# Patient Record
Sex: MALE | Race: WHITE | NOT HISPANIC OR LATINO | URBAN - METROPOLITAN AREA
[De-identification: names, ages, dates, MRNs, and addresses within clinical notes are randomized per-mention and may not be internally consistent; named-entity substitution may affect disease eponyms.]

---

## 2017-07-10 RX ORDER — DESOXIMETASONE 0.5 MG/G
1 CREAM TOPICAL 2 TIMES DAILY
COMMUNITY
Start: 2016-07-18 | End: 2017-07-10 | Stop reason: SDUPTHER

## 2017-07-10 RX ORDER — METHYLPREDNISOLONE 4 MG
2 TABLET, DOSE PACK ORAL DAILY
COMMUNITY
End: 2023-09-07

## 2017-07-19 ENCOUNTER — OFFICE VISIT (OUTPATIENT)
Dept: FAMILY MEDICINE | Facility: CLINIC | Age: 52
End: 2017-07-19
Payer: COMMERCIAL

## 2017-07-19 VITALS
HEART RATE: 76 BPM | HEIGHT: 64 IN | WEIGHT: 162 LBS | DIASTOLIC BLOOD PRESSURE: 76 MMHG | BODY MASS INDEX: 27.66 KG/M2 | SYSTOLIC BLOOD PRESSURE: 106 MMHG

## 2017-07-19 DIAGNOSIS — Z12.5 SCREENING FOR PROSTATE CANCER: ICD-10-CM

## 2017-07-19 DIAGNOSIS — Z00.00 ANNUAL PHYSICAL EXAM: Primary | ICD-10-CM

## 2017-07-19 PROBLEM — E66.3 OVERWEIGHT: Status: ACTIVE | Noted: 2017-07-19

## 2017-07-19 PROBLEM — Z87.39 HISTORY OF ARTHRITIS: Status: ACTIVE | Noted: 2017-07-19

## 2017-07-19 PROBLEM — Z86.69 HISTORY OF HEARING PROBLEM: Status: ACTIVE | Noted: 2017-07-19

## 2017-07-19 PROBLEM — Z78.9 NON-SMOKER: Status: ACTIVE | Noted: 2017-07-19

## 2017-07-19 PROCEDURE — 99396 PREV VISIT EST AGE 40-64: CPT | Mod: ,,, | Performed by: INTERNAL MEDICINE

## 2017-07-19 NOTE — PROGRESS NOTES
Subjective:       Patient ID: Brandon Suazo Sr. is a 51 y.o. male.    Chief Complaint: Annual Exam    The patient is  and has children (4 children). He works as a Contractor construction. The most recent health maintenance visit was year(s) ago. General health since the last visit is described as good. Prostate cancer screening includes prostate-specific antigen testing last year. Testicular cancer screening includes irregular self testicular examinations. Colorectal cancer screening includes no previous fecal occult blood testing. Metabolic screening includes a lipid profile within the past five years, glucose screening last year and uncertain timing of previous thyroid function test. Dental care includes brushing 2 times per day, flossing 1-2 times per day and regular dental visits. The patient reports wearing reading glasses. Hearing is normal. Immunizations are up to date. Current diet includes limited junk food. Dietary supplements do not include daily multivitamins, calcium or herbal products. The patient exercises daily. He is not concerned about weight. The patient has never smoked cigarettes. He reports occasional alcohol use. He has never used illicit drugs. He is sexually active and is monogamous with a female partner. For contraception he uses condoms. He has no concerns about erectile dysfunction. cardiovascular risk factors do not include hypertension, diabetes, obesity, tobacco use, illicit drug use or sedentary lifestyle. Pertinent family history includes dementia (M GM mild dementia) and lung cancer (Grand Father smoker), while pertinent family history does not include prostate cancer or colon cancer. Safety elements effectively used include: seat belt and smoke detector. Risk findings include: occupational exposure (construction), while risk findings do not include: passive smoke exposure, unsafe sexual behavior, stress management concerns, anger management concerns, unsafe driving habits,  multiple driving violations, history of DUI / DWI, memory loss, fall risk, travel to developing areas or tuberculosis exposure. He does not have a living will, durable power of  for health care directives or copy of advance directives on file.        Past Medical History:   Diagnosis Date    Hyperlipidemia     Psoriasis      Social History     Social History    Marital status:      Spouse name: N/A    Number of children: N/A    Years of education: N/A     Occupational History    Not on file.     Social History Main Topics    Smoking status: Never Smoker    Smokeless tobacco: Never Used    Alcohol use Yes    Drug use: No    Sexual activity: Yes     Partners: Female     Other Topics Concern    Not on file     Social History Narrative    No narrative on file     Past Surgical History:   Procedure Laterality Date    TONSILLECTOMY       Family History   Problem Relation Age of Onset    Hypertension Mother     Diabetes Mother     Macular degeneration Mother     Cancer Father      liver     Alcohol abuse Father     Asthma Paternal Aunt     Cancer Maternal Grandfather      lung/ smoker    HIV Maternal Uncle        Review of Systems   Constitutional: Negative for activity change, appetite change, fatigue and unexpected weight change.   HENT: Negative for congestion, sneezing and trouble swallowing.    Eyes: Negative for pain and visual disturbance.   Respiratory: Negative for cough, chest tightness and shortness of breath.    Cardiovascular: Negative for chest pain, palpitations and leg swelling.   Gastrointestinal: Negative for abdominal distention, abdominal pain, blood in stool, constipation and diarrhea.   Endocrine: Negative for cold intolerance, heat intolerance, polydipsia, polyphagia and polyuria.   Genitourinary: Negative for dysuria, hematuria and scrotal swelling.   Musculoskeletal: Positive for arthralgias. Negative for back pain and gait problem.        History of mild  "arthritis.   Skin: Positive for rash. Negative for pallor and wound.        History of mild psoriasis on the skin.   Allergic/Immunologic: Negative for environmental allergies, food allergies and immunocompromised state.   Neurological: Negative for dizziness, seizures, speech difficulty, light-headedness and numbness.   Hematological: Negative for adenopathy. Does not bruise/bleed easily.   Psychiatric/Behavioral: Negative for agitation, behavioral problems and confusion. The patient is not nervous/anxious.        Objective:       Vitals:    07/19/17 1025   BP: 106/76   Pulse: 76   Weight: 73.5 kg (162 lb)   Height: 5' 4" (1.626 m)     Physical Exam   Constitutional: He is oriented to person, place, and time. He appears well-developed and well-nourished. No distress.   HENT:   Head: Normocephalic and atraumatic.   Nose: Nose normal.   Mouth/Throat: Oropharynx is clear and moist. No oropharyngeal exudate.   Eyes: Conjunctivae and EOM are normal.   Neck: Normal range of motion. Neck supple. No JVD present. No tracheal deviation present. No thyromegaly present.   Cardiovascular: Normal rate, regular rhythm and normal heart sounds.  Exam reveals no gallop and no friction rub.    No murmur heard.  Pulmonary/Chest: Effort normal and breath sounds normal. No respiratory distress. He has no wheezes. He has no rales.   Abdominal: Soft. Bowel sounds are normal. He exhibits no distension. There is no tenderness. There is no guarding. No hernia.   Musculoskeletal: Normal range of motion.   Lymphadenopathy:     He has no cervical adenopathy.   Neurological: He is alert and oriented to person, place, and time. He has normal reflexes.   Skin: Skin is warm and dry.   Mild skin changes in the back   Psychiatric: He has a normal mood and affect. His behavior is normal.   Nursing note and vitals reviewed.      Assessment:       1. Annual physical exam    2. Screening for prostate cancer         Plan:           Annual physical exam  - "     Cancel: Glucose, fasting; Future; Expected date: 07/19/2017  -     Cancel: Lipid panel; Future; Expected date: 07/19/2017  -     Glucose, fasting; Future; Expected date: 07/19/2017  -     Lipid panel; Future; Expected date: 07/19/2017    Screening for prostate cancer  -     Cancel: PSA, Screening; Future; Expected date: 07/19/2017  -     PSA, Screening; Future; Expected date: 07/19/2017    Advised Mr. Suazo about age and season appropriate immunizations/ cancer screenings.  Also seasonal influenza vaccine, update on tetanus diphtheria vaccination every 10 years.

## 2017-07-19 NOTE — PATIENT INSTRUCTIONS
Prevention Guidelines, Men Ages 50 to 64  Screening tests and vaccines are an important part of managing your health. Health counseling is essential, too. Below are guidelines for these, for men ages 50 to 64. Talk with your healthcare provider to make sure youre up-to-date on what you need.  Screening Who needs it How often   Alcohol misuse All men in this age group At routine exams   Blood pressure All men in this age group Every 2 years if your blood pressure is less than 120/80 mm Hg; yearly if your systolic blood pressure is 120 to 139 mm Hg, or your diastolic blood pressure reading is 80 to 89 mm Hg   Colorectal cancer All men in this age group Flexible sigmoidoscopy every 5 years, or colonoscopy every 10 years, or double-contrast barium enema every 5 years; yearly fecal occult blood test or fecal immunochemical test; or a stool DNA test as often as your healthcare provider advises; talk with your healthcare provider about which tests are best for you   Depression All men in this age group At routine exams   Type 2 diabetes or prediabetes All adults beginning at age 45 and adults without symptoms at any age who are overweight or obese and have 1 or more other risk factors for diabetes At least every 3 years   Hepatitis C Men at increased risk for infection - talk with your healthcare provider At routine exams   High cholesterol or triglycerides All men in this age group At least every 5 years   HIV Men at increased risk for infection - talk with your healthcare provider At routine exams   Lung cancer Adults age 55 to 80 who have smoked Yearly screening in smokers with 30 pack-year history of smoking or who quit within 15 years   Obesity All men in this age group At routine exams   Prostate cancer Starting at age 45, talk to healthcare provider about risks and benefits of digital rectal exam (MARÍA) and prostate-specific antigen (PSA) screening1 At routine exams   Syphilis Men at increased risk for infection -  talk with your healthcare provider At routine exams   Tuberculosis Men at increased risk for infection - talk with your healthcare provider Ask your healthcare provider   Vision All men in this age group Ask your healthcare provider   Vaccine Who needs it How often   Chickenpox (varicella) All men in this age group who have no record of this infection or vaccine 2 doses; second dose should be given at least 4 weeks after the first dose   Hepatitis A Men at increased risk for infection - talk with your healthcare provider 2 doses given at least 6 months apart   Hepatitis B Men at increased risk for infection - talk with your healthcare provider 3 doses over 6 months; second dose should be given 1 month after the first dose; the third dose should be given at least 2 months after the second dose and at least 4 months after the first dose   Haemophilus influenzae Type B (HIB) Men at increased risk for infection - talk with your healthcare provider 1 to 3 doses   Influenza (flu) All men in this age group Once a year   Measles, mumps, rubella (MMR) Men in this age group through their late 50s who have no record of these infections or vaccines 1 or 2 dose; ask your healthcare provider   Meningococcal Men at increased risk for infection - talk with your healthcare provider 1 or more doses   Pneumococcal conjugate vaccine (PCV13) and pneumococcal polysaccharide vaccine (PPSV23) Men at increased risk for infection - talk with your healthcare provider PCV13: 1 dose ages 19 to 65 (protects against 13 types of pneumococcal bacteria)     PPSV23: 1 to 2 doses through age 64, or 1 dose at 65 or older (protects against 23 types of pneumococcal bacteria)      Tetanus/diphtheria/  pertussis (Td/Tdap) booster All men in this age group Td every 10 years, or a one-time dose of Tdap instead of a Td booster after age 18, then Td every 10 years   Zoster All men ages 60 and older 1 dose   Counseling Who needs it How often   Diet and exercise  Men who are overweight or obese When diagnosed, and then at routine exams   Sexually transmitted infection prevention Men at increased risk for infection - talk with your healthcare provider At routine exams   Use of daily aspirin Men in this age group at risk for cardiovascular health problems At routine exams   Use of tobacco and the health affects it can cause All men in this age group Every visit   [address] [place] [address] [facility]  Date Last Reviewed: 3/30/2015  © 9400-1285 [employer]. [address] [place] [address], [place] [address]. All rights reserved. This information is not intended as a substitute for professional medical care. Always follow your healthcare professional's instructions.

## 2017-07-24 LAB
COMPLEXED PSA SERPL-MCNC: 1 NG/ML (ref 0–3)
GLUCOSE: 98 MG/DL (ref 70–99)

## 2017-12-08 ENCOUNTER — OFFICE VISIT (OUTPATIENT)
Dept: FAMILY MEDICINE | Facility: CLINIC | Age: 52
End: 2017-12-08
Payer: COMMERCIAL

## 2017-12-08 VITALS
SYSTOLIC BLOOD PRESSURE: 136 MMHG | HEART RATE: 85 BPM | DIASTOLIC BLOOD PRESSURE: 85 MMHG | BODY MASS INDEX: 28.06 KG/M2 | WEIGHT: 163.5 LBS

## 2017-12-08 DIAGNOSIS — L02.415 ABSCESS OF RIGHT KNEE: Primary | ICD-10-CM

## 2017-12-08 PROCEDURE — 99212 OFFICE O/P EST SF 10 MIN: CPT | Mod: 25,,, | Performed by: INTERNAL MEDICINE

## 2017-12-08 PROCEDURE — 10060 I&D ABSCESS SIMPLE/SINGLE: CPT | Mod: ,,, | Performed by: INTERNAL MEDICINE

## 2017-12-08 RX ORDER — SULFAMETHOXAZOLE AND TRIMETHOPRIM 800; 160 MG/1; MG/1
1 TABLET ORAL 2 TIMES DAILY
Qty: 10 TABLET | Refills: 0 | Status: SHIPPED | OUTPATIENT
Start: 2017-12-08 | End: 2018-07-19

## 2017-12-08 NOTE — PROGRESS NOTES
Subjective:       Patient ID: Brandon Suazo Sr. is a 52 y.o. male.    Chief Complaint: Knee Pain (right knee infection); Recurrent Skin Infections; and Abscess    Knee Pain    The incident occurred 5 to 7 days ago. There was no injury mechanism. The pain is present in the right knee. The quality of the pain is described as burning. The pain is at a severity of 4/10. The pain is moderate.   Abscess   Chronicity:  NewProgression Since Onset: unchanged  Location:  Leg  Associated Symptoms: no fever, no chills, no sweats  Characteristics: painful    Characteristics: not draining    Pain Scale:  4/10      Past Medical History:   Diagnosis Date    Hyperlipidemia     Psoriasis      Social History     Social History    Marital status:      Spouse name: N/A    Number of children: N/A    Years of education: N/A     Occupational History    Not on file.     Social History Main Topics    Smoking status: Never Smoker    Smokeless tobacco: Never Used    Alcohol use Yes    Drug use: No    Sexual activity: Yes     Partners: Female     Other Topics Concern    Not on file     Social History Narrative    No narrative on file     Past Surgical History:   Procedure Laterality Date    TONSILLECTOMY       Family History   Problem Relation Age of Onset    Hypertension Mother     Diabetes Mother     Macular degeneration Mother     Cancer Father      liver     Alcohol abuse Father     Asthma Paternal Aunt     Cancer Maternal Grandfather      lung/ smoker    HIV Maternal Uncle        Review of Systems   Constitutional: Negative for chills and fever.   HENT: Negative for congestion and dental problem.    Eyes: Negative for discharge and itching.   Respiratory: Negative for choking and chest tightness.    Gastrointestinal: Negative for abdominal distention and abdominal pain.   Musculoskeletal: Negative for arthralgias and back pain.        Abscess on the right knee.   Skin:        Abscess on the right knee.        Objective:       Vitals:    12/08/17 1043   BP: 136/85   Pulse: 85   Weight: 74.2 kg (163 lb 8 oz)     Physical Exam   Constitutional: He appears well-developed and well-nourished. No distress.   HENT:   Head: Normocephalic and atraumatic.   Neck: Neck supple.   Cardiovascular: Normal rate.    Pulmonary/Chest: Effort normal.   Musculoskeletal:        Right knee: He exhibits erythema. He exhibits normal range of motion, no swelling, no effusion and no bony tenderness. No medial joint line, no lateral joint line, no MCL, no LCL and no patellar tendon tenderness noted.        Legs:  Range of motion of the knee joint itself is complete. The abscess seems to be superficial.   Nursing note and vitals reviewed.      Assessment:       1. Abscess of right knee         Plan:           Abscess of right knee  -     sulfamethoxazole-trimethoprim 800-160mg (BACTRIM DS) 800-160 mg Tab; Take 1 tablet by mouth 2 (two) times daily.  Dispense: 10 tablet; Refill: 0  -     INCISION AND DRAINAGE    Incision and drainage was performed after getting a general consent and verbal consent from the patient. Minimal amount of sanguinous expression was made. Pressure was applied around the abscess area to drain out any remnant puss. Not much of an abscess.    Patient tolerated the procedure well. He'll be treated with Bactrim. Local hygiene and clamminess measures have been discussed. He will keep his regular follow-up. He'll be no by next week as to how he is doing.    His last tetanus vaccination was in 2014.

## 2017-12-08 NOTE — PATIENT INSTRUCTIONS
Abscess (Incision & Drainage)  An abscess is sometimes called a boil. It happens when bacteria get trapped under the skin and start to grow. Pus forms inside the abscess as the body responds to the bacteria. An abscess can happen with an insect bite, ingrown hair, blocked oil gland, pimple, cyst, or puncture wound.  Your healthcare provider has drained the pus from your abscess. If the abscess pocket was large, your healthcare provider may have put in gauze packing. Your provider will need to remove it on your next visit. He or she may also replace it at that time. You may not need antibiotics to treat a simple abscess, unless the infection is spreading into the skin around the wound (cellulitis).  The wound will take about 1 to 2 weeks to heal, depending on the size of the abscess. Healthy tissue will grow from the bottom and sides of the opening until it seals over.  Home care  These tips can help your wound heal:  · The wound may drain for the first 2 days. Cover the wound with a clean dry dressing. Change the dressing if it becomes soaked with blood or pus.  · If a gauze packing was placed inside the abscess pocket, you may be told to remove it yourself. You may do this in the shower. Once the packing is removed, you should wash the area in the shower, or clean the area as directed by your provider. Continue to do this until the skin opening has closed. Make sure you wash your hands after changing the packing or cleaning the wound.  · If you were prescribed antibiotics, take them as directed until they are all gone.  · You may use acetaminophen or ibuprofen to control pain, unless another pain medicine was prescribed. If you have liver disease or ever had a stomach ulcer, talk with your doctor before using these medicines.  Follow-up care  Follow up with your healthcare provider, or as advised. If a gauze packing was put in your wound, it should be removed in 1 to 2 days. Check your wound every day for any  signs that the infection is getting worse. The signs are listed below.  When to seek medical advice  Call your healthcare provider right away if any of these occur:  · Increasing redness or swelling  · Red streaks in the skin leading away from the wound  · Increasing local pain or swelling  · Continued pus draining from the wound 2 days after treatment  · Fever of 100.4ºF (38ºC) or higher, or as directed by your healthcare provider  · Boil returns when you are at home  Date Last Reviewed: 9/1/2016  © 5382-5918 [employer]. [address] [place] [address]. All rights reserved. This information is not intended as a substitute for professional medical care. Always follow your healthcare professional's instructions.

## 2017-12-08 NOTE — PROCEDURES
Incision & Drainage  Date/Time: 12/8/2017 11:25 AM  Performed by: TIGRE GARCIA  Authorized by: TIGRE GARCIA       Type:  Abscess  Body area:  Lower extremity  Location details:  Right leg  Anesthesia:  Local infiltration  Local anesthetic: Lidocaine 1% without epinephrine  Scalpel size:  11  Incision type:  Single straight  Complexity:  Simple  Drainage:  Serosanguinous  Drainage amount:  Scant  Wound treatment:  Expression of material and no return  Packing material:  Vaseline gauze  Patient tolerance:  Patient tolerated the procedure well with no immediate complications

## 2018-07-19 ENCOUNTER — OFFICE VISIT (OUTPATIENT)
Dept: FAMILY MEDICINE | Facility: CLINIC | Age: 53
End: 2018-07-19
Payer: COMMERCIAL

## 2018-07-19 VITALS
RESPIRATION RATE: 16 BRPM | DIASTOLIC BLOOD PRESSURE: 74 MMHG | HEART RATE: 69 BPM | SYSTOLIC BLOOD PRESSURE: 119 MMHG | OXYGEN SATURATION: 97 % | WEIGHT: 163 LBS | BODY MASS INDEX: 27.83 KG/M2 | HEIGHT: 64 IN

## 2018-07-19 DIAGNOSIS — Z12.11 SCREENING FOR COLON CANCER: ICD-10-CM

## 2018-07-19 DIAGNOSIS — L40.9 PSORIASIS: ICD-10-CM

## 2018-07-19 DIAGNOSIS — Z11.59 NEED FOR HEPATITIS C SCREENING TEST: ICD-10-CM

## 2018-07-19 DIAGNOSIS — Z12.5 SCREENING FOR PROSTATE CANCER: ICD-10-CM

## 2018-07-19 DIAGNOSIS — Z00.00 ANNUAL PHYSICAL EXAM: Primary | ICD-10-CM

## 2018-07-19 PROCEDURE — 99396 PREV VISIT EST AGE 40-64: CPT | Mod: ,,, | Performed by: INTERNAL MEDICINE

## 2018-07-19 RX ORDER — DESOXIMETASONE 0.5 MG/G
CREAM TOPICAL 2 TIMES DAILY
Qty: 30 G | Refills: 1 | Status: SHIPPED | OUTPATIENT
Start: 2018-07-19 | End: 2019-07-24

## 2018-07-19 NOTE — PROGRESS NOTES
Subjective:       Patient ID: Brandon Suazo SrFadumo is a 52 y.o. male.    Chief Complaint: Annual Exam    Mr. Brandon Suazo is a pleasant 52-year-old  male who comes for annual physical. Thus far, he has been maintaining a reasonably good health. He does not have a diagnosis of hypertension, significant dyslipidemia or sugar diabetes.    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 Sometimes there seemed to be a little bit of stress over contract and bidding jobs. But he seems to deal with stressful issues fairly well. He is nonsmoker and alcohol consumption is purely social.    He does participate in hiking, biking and other activities. His job is most of his activities which keeps him nimble and agile.    He is happily  and father before children. One of his daughter works in the ICU of [employer] and she will be delivering today with patient's second grandchild.    Family history again has been reviewed with nothing significantly unremarkable.    He drives safely and does not have any driving volitions or tickets.    Dental care is regular at no major change in his eyesight.    He is due for colonoscopy and colon screening.    Current PSA is 1.0 and is within normal range. He has had PSA checks in the last 10 years which are in acceptable range.    He does have some arthritic symptoms he does take some glucosamine for the same.    He does make her regular visit to his dermatologist for some precancerous lesions in this scalp and he does use protection for the same.        Past Medical History:   Diagnosis Date    Hyperlipidemia     Psoriasis      Social History     Social History    Marital status:      Spouse name: N/A    Number of children: 4    Years of education: N/A     Occupational History    Construction- contracter General      Self - Brandon Suazo - construction     Social History Main Topics    Smoking status: Never Smoker    Smokeless tobacco:  Never Used    Alcohol use Yes    Drug use: No    Sexual activity: Yes     Partners: Female     Other Topics Concern    Not on file     Social History Narrative    No narrative on file     Past Surgical History:   Procedure Laterality Date    TONSILLECTOMY       Family History   Problem Relation Age of Onset    Hypertension Mother     Diabetes Mother     Macular degeneration Mother     Cancer Father         liver     Alcohol abuse Father     Asthma Paternal Aunt     Cancer Maternal Grandfather         lung/ smoker    HIV Maternal Uncle        Review of Systems   Constitutional: Negative for activity change, appetite change, chills, fatigue, fever and unexpected weight change.   HENT: Negative for congestion, dental problem, mouth sores, postnasal drip, sneezing and trouble swallowing.    Eyes: Negative for pain, discharge, itching and visual disturbance.   Respiratory: Negative for cough, choking, chest tightness and shortness of breath.    Cardiovascular: Negative for chest pain, palpitations and leg swelling.   Gastrointestinal: Negative for abdominal distention, abdominal pain, blood in stool, constipation and diarrhea.   Endocrine: Negative for cold intolerance, heat intolerance, polydipsia, polyphagia and polyuria.   Genitourinary: Negative for difficulty urinating, dysuria, flank pain, hematuria and scrotal swelling.        Patient gets up once at night to go to the bathroom.   Musculoskeletal: Negative for arthralgias, back pain and gait problem.        Mild arthritic symptoms in the joints which are better after taking glucosamine.   Skin: Negative for pallor, rash and wound.        Precancerous lesions on the scalp.   Allergic/Immunologic: Negative for environmental allergies, food allergies and immunocompromised state.   Neurological: Negative for dizziness, seizures, speech difficulty, light-headedness and numbness.   Hematological: Negative for adenopathy. Does not bruise/bleed easily.  "  Psychiatric/Behavioral: Negative for agitation, behavioral problems and confusion. The patient is not nervous/anxious.         Mild distress because of work-related issues and also his daughter is going to deliver a baby today. He is going to be grandfather times second now.       Objective:       Vitals:    07/19/18 1112 07/19/18 1154   BP: (!) 135/91 119/74   BP Location:  Right arm   Patient Position:  Sitting   Pulse: 69    Resp: 16    SpO2: 97%    Weight: 73.9 kg (163 lb)    Height: 5' 4" (1.626 m)      Physical Exam   Constitutional: He appears well-developed and well-nourished. No distress.   HENT:   Head: Normocephalic and atraumatic.   Right Ear: Tympanic membrane normal.   Left Ear: Tympanic membrane normal.   Nose: No mucosal edema or sinus tenderness.   Eyes: EOM are normal. Right eye exhibits no discharge. Left eye exhibits no discharge.   Neck: Neck supple.   Cardiovascular: Normal rate and regular rhythm.    Pulmonary/Chest: Effort normal.   Abdominal: Soft. Bowel sounds are normal.   Lymphadenopathy:     He has no cervical adenopathy.     He has no axillary adenopathy.   Neurological: He is alert.   Skin: He is not diaphoretic.   Psychiatric: He has a normal mood and affect. His speech is normal and behavior is normal.   Nursing note and vitals reviewed.      Assessment:       1. Annual physical exam    2. Screening for colon cancer    3. Screening for prostate cancer    4. Need for hepatitis C screening test         Plan:           Annual physical exam  -     Glucose, fasting; Future; Expected date: 07/19/2018  -     Lipid panel; Future; Expected date: 07/19/2018    Screening for colon cancer  -     Cologuard Screening (Multitarget Stool DNA)    Screening for prostate cancer  -     PSA, Screening; Future; Expected date: 07/19/2018    Need for hepatitis C screening test  -     Hepatitis C antibody; Future; Expected date: 07/19/2018    Advised Mr. Suazo about age and season appropriate " immunizations/ cancer screenings.  Also seasonal influenza vaccine, update on tetanus diphtheria vaccination every 10 years.    Patient will be screened again for blood glucose, lipid panel and PSA. He agrees to consider Cologuard screening if allowed by his insurance. If negative, the next repeat for Cologuard will be in 3 years time as opposed to a normal colonoscopy which is repeated every 10 years.    However, after Cologuard turns positive for one or the other reason he will eventually have to have a colonoscopy.    I will notify him off the labs.    Otherwise all things said and done, he should continue his activities and keep himself physically also active. He'll continue to watch his diet and avoid fried, greasy food and more greens and vegetables.    He does have some arthritic symptoms he does take some glucosamine for the same.    He does make her regular visit to his dermatologist for some precancerous lesions in this scalp and he does use protection for the same.    Please also note that patient's blood pressure at arrival was slightly elevated what seem to settle down fairly good after 15-20 minutes.

## 2018-07-19 NOTE — PATIENT INSTRUCTIONS

## 2018-07-31 LAB
COMPLEXED PSA SERPL-MCNC: 2.3 NG/ML (ref 0–3)
GLUCOSE: 89 MG/DL (ref 70–99)

## 2018-08-01 LAB — HCV AB SERPL QL IA: <0.1 S/CO RATIO (ref 0–0.9)

## 2018-10-14 ENCOUNTER — PATIENT MESSAGE (OUTPATIENT)
Dept: FAMILY MEDICINE | Facility: CLINIC | Age: 53
End: 2018-10-14

## 2018-10-28 ENCOUNTER — PATIENT MESSAGE (OUTPATIENT)
Dept: FAMILY MEDICINE | Facility: CLINIC | Age: 53
End: 2018-10-28

## 2019-07-24 ENCOUNTER — OFFICE VISIT (OUTPATIENT)
Dept: FAMILY MEDICINE | Facility: CLINIC | Age: 54
End: 2019-07-24

## 2019-07-24 VITALS
SYSTOLIC BLOOD PRESSURE: 117 MMHG | DIASTOLIC BLOOD PRESSURE: 77 MMHG | HEIGHT: 64 IN | BODY MASS INDEX: 27.49 KG/M2 | HEART RATE: 62 BPM | WEIGHT: 161 LBS

## 2019-07-24 DIAGNOSIS — Z00.00 ANNUAL PHYSICAL EXAM: Primary | ICD-10-CM

## 2019-07-24 DIAGNOSIS — Z12.5 SCREENING FOR PROSTATE CANCER: ICD-10-CM

## 2019-07-24 PROCEDURE — 99396 PREV VISIT EST AGE 40-64: CPT | Mod: ,,, | Performed by: INTERNAL MEDICINE

## 2019-07-24 PROCEDURE — 99396 PR PREVENTIVE VISIT,EST,40-64: ICD-10-PCS | Mod: ,,, | Performed by: INTERNAL MEDICINE
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
## 2019-07-24 NOTE — PATIENT INSTRUCTIONS
Preventing Cancer  Many cancer cases are linked to lifestyle. Healthy lifestyle choices can help lower your risk for cancer and many other diseases. They can also improve your overall health.    Stop smoking  · Talk with your healthcare provider about aids for quitting, such as nicotine patches and some prescription medicines.  · Get help from ex-smokers.  · Create a plan for quitting.  · Pick a quit date and stick to it.  Stay at a healthy weight  · Get to and stay at a healthy weight all your life.  · If you're overweight, losing even a little weight is good for you.  Keep active  · Get regular physical activity.  · Take walks, garden, or do other activities you enjoy each day.  · Do errands on foot or bike, not by car.  · Join a walking or biking club.  · Limit the time you spend sitting to do things like watch TV, play video games, or use a computer.  Eat a healthy diet  · Eat fewer red meats and processed meats.  · Eat at least 2.5 cups of fruits and vegetables daily, especially leafy greens.  · Eat more whole grains instead of refined grain products.  · Limit alcohol to 2 drinks a day for men and 1 drink a day for women.  · Limit high-calorie foods and drinks.  · Read food labels to be more aware of calories and portion sizes.  Protect yourself from hazards  · When outside during the day, use sunscreen that is broad-spectrum and SPF 30 or greater.  · When out in sunlight, wear a hat and sunglasses.  · Seek shade in the middle of the day when the sun is hottest.  · Be aware of all hazardous products at work or in your home.  · When working with hazardous products, wear protective clothing  Talk with your provider about cancer screenings  Regular screening can help prevent some types of cancer, such as cervical and colorectal cancer. Regular screening for these cancers can find and remove abnormal areas before they become cancer. For some other types of cancer, screening may help find cancer early, when it's  small. This is when treatment is most likely to be effective. Here are some ways you can screen for certain cancers:  · Breast cancer. Breast self-awareness and mammogram.  · Skin cancer. Self-exam, professional exam, biopsy of any changes that might be cancer.  · Cervical cancer. Pap test, HPV test.  · Colorectal cancer. Screening for blood or DNA in stool, colonoscopy or other tests to look inside the colon.  · Prostate cancer. PSA blood test with or without a digital rectal exam.  · Testicular cancer. Self-exam, professional exam.  Talk with your healthcare provider about your family history and your cancer risk. Together you can decide on the cancer screening plan that's best for you.  Date Last Reviewed: 11/18/2015  © 3735-6989 [employer]. [address] [place] [address], [place] [address]. All rights reserved. This information is not intended as a substitute for professional medical care. Always follow your healthcare professional's instructions.

## 2019-08-23 LAB
CHOLEST SERPL-MCNC: 200 MG/DL (ref 100–199)
GLUCOSE P FAST SERPL-MCNC: 97 MG/DL (ref 65–99)
HDLC SERPL-MCNC: 47 MG/DL
LDLC SERPL CALC-MCNC: 133 MG/DL (ref 0–99)
PSA SERPL-MCNC: 1.2 NG/ML (ref 0–4)
TRIGL SERPL-MCNC: 101 MG/DL (ref 0–149)
VLDLC SERPL CALC-MCNC: 20 MG/DL (ref 5–40)

## 2021-04-29 ENCOUNTER — PATIENT MESSAGE (OUTPATIENT)
Dept: RESEARCH | Facility: HOSPITAL | Age: 56
End: 2021-04-29

## 2021-06-23 ENCOUNTER — PATIENT MESSAGE (OUTPATIENT)
Dept: FAMILY MEDICINE | Facility: CLINIC | Age: 56
End: 2021-06-23

## 2021-06-23 ENCOUNTER — OFFICE VISIT (OUTPATIENT)
Dept: FAMILY MEDICINE | Facility: CLINIC | Age: 56
End: 2021-06-23

## 2021-06-23 VITALS
WEIGHT: 161 LBS | SYSTOLIC BLOOD PRESSURE: 117 MMHG | BODY MASS INDEX: 27.49 KG/M2 | DIASTOLIC BLOOD PRESSURE: 73 MMHG | HEART RATE: 71 BPM | HEIGHT: 64 IN

## 2021-06-23 DIAGNOSIS — E04.1 THYROID CYST: ICD-10-CM

## 2021-06-23 DIAGNOSIS — N52.9 ERECTILE DYSFUNCTION, UNSPECIFIED ERECTILE DYSFUNCTION TYPE: ICD-10-CM

## 2021-06-23 DIAGNOSIS — H93.13 TINNITUS OF BOTH EARS: Primary | ICD-10-CM

## 2021-06-23 PROCEDURE — 99213 PR OFFICE/OUTPT VISIT, EST, LEVL III, 20-29 MIN: ICD-10-PCS | Mod: S$PBB,,, | Performed by: INTERNAL MEDICINE

## 2021-06-23 PROCEDURE — 99213 OFFICE O/P EST LOW 20 MIN: CPT | Mod: S$PBB,,, | Performed by: INTERNAL MEDICINE

## 2021-06-23 PROCEDURE — 99213 OFFICE O/P EST LOW 20 MIN: CPT | Performed by: INTERNAL MEDICINE

## 2021-06-23 RX ORDER — SILDENAFIL 50 MG/1
50 TABLET, FILM COATED ORAL DAILY PRN
Qty: 10 TABLET | Refills: 5 | Status: SHIPPED | OUTPATIENT
Start: 2021-06-23 | End: 2022-06-22 | Stop reason: SDUPTHER

## 2021-07-06 ENCOUNTER — PATIENT MESSAGE (OUTPATIENT)
Dept: FAMILY MEDICINE | Facility: CLINIC | Age: 56
End: 2021-07-06

## 2022-06-20 NOTE — PROGRESS NOTES
Subjective:       Patient ID: Brandon Suazo SrFadumo is a 56 y.o. male.    Chief Complaint: Annual Exam    Mr. Brandon Suazo is a pleasant 56-year-old  male who comes for annual checkup.      Thus far he has not been diagnosed with any major medical issues.  He does have some minor arthritic symptoms for which he takes over-the-counter supplements like glucosamine/chondroitin.    Medical updates also include a moderate degree of persistent pain in the right knee off late for perhaps several months to years.  This effects his activities especially that he loves like hiking.  He had seen a chiropractor who had indicated that it might be a pulled MCL and he tried some manipulation therapy which might have helped him initially but not later on.  After that he was referred to orthopedic surgeon Dr. Joel Gallo who did x-rays and indicated that he has arthritis and probably aggravated his MCL.  He did get a cortisone shot from the lateral approach which gave him relief perhaps for a few months or so.  He had to recently gone other hiking trip and he was given a steroid pack on Mobic which did help him.  He tried some over-the-counter supplements like relief Factor which did not help him.  He continues to have pain on the medial aspect of his right knee joint.  Down the road the plan is perhaps to seek another consultation and see where we go from there.    He also had a lifeline screening which was negative for AAA, liver, spleen or kidney problems.  A small nodule or cystic nodule was noted in the left thyroid gland last year.      Previous labs have shown minimal dyslipidemia which did not merit treatment at this point.  His blood sugars have always have been normal.    Thus far he does not have any major prostate symptoms.  His PSA had shown a minimal rise recently.    He is nonsmoker and social alcohol use.  No illicit substance abuse.    He works as a  for buildings and projects.  He is   and has grandchildren.    He enjoys outdoors and activities like a rock climbing, hiking etc.    He drives safely.    He was updated on tetanus vaccination in 2014.  He had a Cologuard testing  October 2018 which was negative.  He will now be due for next Cologuard testing.    Mentally he seems to be happy and is in good and satisfactory relationship with his wife and family members and his colleagues and employees.    Family history also has been reviewed and updated.  (No major change)    Discussed about shingles vaccine and booster dose of COVID vaccine.      Past Medical History:   Diagnosis Date    Hyperlipidemia     Psoriasis      Social History     Socioeconomic History    Marital status:     Number of children: 4   Occupational History    Occupation: Construction- contracter General     Comment: Self - Brandon Suazo - [employer]   Tobacco Use    Smoking status: Never Smoker    Smokeless tobacco: Never Used   Substance and Sexual Activity    Alcohol use: Yes     Alcohol/week: 2.0 - 4.0 standard drinks     Types: 1 - 2 Cans of beer, 1 - 2 Shots of liquor per week     Comment: 1-2 per week    Drug use: No    Sexual activity: Yes     Partners: Female     Comment: No protection-wife may be perimenopausal.   Social History Narrative     [facility]        G- 34    G-32    B- 28    G 22     Social Determinants of Health     Financial Resource Strain: Low Risk     Difficulty of Paying Living Expenses: Not hard at all   Food Insecurity: No Food Insecurity    Worried About Running Out of Food in the Last Year: Never true    Ran Out of Food in the Last Year: Never true   Transportation Needs: No Transportation Needs    Lack of Transportation (Medical): No    Lack of Transportation (Non-Medical): No   Physical Activity: Sufficiently Active    Days of Exercise per Week: 5 days    Minutes of Exercise per Session: 30 min   Stress: No Stress Concern Present    Feeling of Stress : Only a little   Social  Connections: Socially Integrated    Frequency of Communication with Friends and Family: Three times a week    Frequency of Social Gatherings with Friends and Family: Three times a week    Attends [employer] Services: 1 to 4 times per year    Active Member of Clubs or Organizations: Yes    Attends Club or Organization Meetings: Never    Marital Status:    Housing Stability: Low Risk     Unable to Pay for Housing in the Last Year: No    Number of Places Lived in the Last Year: 1    Unstable Housing in the Last Year: No     Past Surgical History:   Procedure Laterality Date    SKIN CANCER EXCISION      TONSILLECTOMY       Family History   Problem Relation Age of Onset    Hypertension Mother     Diabetes Mother     Macular degeneration Mother     Bell's palsy Mother     Cancer Father         liver    Alcohol abuse Father     HIV Maternal Uncle     Asthma Paternal Aunt     Cancer Maternal Grandfather         lung/ smoker       Review of Systems   Constitutional: Negative for activity change, appetite change, diaphoresis, fatigue, fever and unexpected weight change.   HENT: Positive for tinnitus (Persistent but no change.). Negative for congestion, hearing loss, postnasal drip, rhinorrhea and trouble swallowing.    Eyes: Negative for pain, discharge and visual disturbance.   Respiratory: Negative for apnea, cough, chest tightness and wheezing.    Cardiovascular: Negative for chest pain, palpitations and leg swelling.   Gastrointestinal: Negative for abdominal distention, abdominal pain, anal bleeding, blood in stool, constipation, diarrhea and vomiting.   Endocrine: Negative for cold intolerance, heat intolerance, polydipsia and polyuria.        Recently discovered to have a thyroid complex cyst on the left lobe of thyroid.   Genitourinary: Negative for difficulty urinating, enuresis, frequency, hematuria and urgency.        Symptoms of erectile dysfunction.  Nocturia x1.  Good response with  "Viagra.  No major side effects except some nasal stuffiness.   Musculoskeletal: Negative for arthralgias, gait problem, joint swelling and neck pain.   Allergic/Immunologic: Negative for environmental allergies.   Neurological: Negative for dizziness, tremors, speech difficulty, weakness, light-headedness and headaches.   Psychiatric/Behavioral: Negative for agitation, confusion, decreased concentration and dysphoric mood. The patient is not nervous/anxious.          Objective:      Blood pressure 131/76, pulse 71, height 5' 4" (1.626 m), weight 73.9 kg (163 lb). Body mass index is 27.98 kg/m².  Physical Exam  Vitals and nursing note reviewed.   Constitutional:       General: He is not in acute distress.     Appearance: Normal appearance. He is well-developed. He is not ill-appearing, toxic-appearing or diaphoretic.      Comments: BMI is 27.98   HENT:      Head: Normocephalic and atraumatic.      Right Ear: There is no impacted cerumen.      Left Ear: There is no impacted cerumen.      Nose: Nose normal.      Mouth/Throat:      Pharynx: No oropharyngeal exudate.   Eyes:      General: No scleral icterus.     Conjunctiva/sclera: Conjunctivae normal.   Neck:      Thyroid: No thyroid mass, thyromegaly or thyroid tenderness.      Vascular: No JVD.      Trachea: No tracheal deviation.      Comments: Examination of thyroid gland does not show any evidence of mass, nodule or lesion.  Cardiovascular:      Rate and Rhythm: Normal rate and regular rhythm.      Heart sounds: Normal heart sounds. No murmur heard.    No friction rub. No gallop.   Pulmonary:      Effort: Pulmonary effort is normal. No respiratory distress.      Breath sounds: Normal breath sounds. No wheezing or rales.   Abdominal:      General: There is no distension.      Palpations: Abdomen is soft.      Tenderness: There is no abdominal tenderness.   Musculoskeletal:         General: No tenderness or deformity. Normal range of motion.      Cervical back: Neck " supple. No rigidity.      Right lower leg: No edema.      Left lower leg: No edema.      Comments: No tenderness or deformity or discoloration noted on the right chest wall where he had a minor injury recently.   Lymphadenopathy:      Cervical: No cervical adenopathy.      Right cervical: No superficial, deep or posterior cervical adenopathy.     Left cervical: No superficial, deep or posterior cervical adenopathy.   Skin:     General: Skin is warm and dry.      Findings: No lesion or rash.   Neurological:      Mental Status: He is alert and oriented to person, place, and time.      Deep Tendon Reflexes: Reflexes are normal and symmetric.   Psychiatric:         Mood and Affect: Mood normal.         Behavior: Behavior normal.         Thought Content: Thought content normal.           Assessment:       1. Annual physical exam    2. Screening for human immunodeficiency virus    3. Screening for colon cancer           No visits with results within 3 Month(s) from this visit.   Latest known visit with results is:   Office Visit on 07/24/2019   Component Date Value Ref Range Status    Cholesterol 08/22/2019 200 (A) 100 - 199 mg/dL Final    Triglycerides 08/22/2019 101  0 - 149 mg/dL Final    HDL 08/22/2019 47  >39 mg/dL Final    VLDL Cholesterol Sumeet 08/22/2019 20  5 - 40 mg/dL Final    LDL Calculated 08/22/2019 133 (A) 0 - 99 mg/dL Final    PSA - [facility] 08/22/2019 1.2  0.0 - 4.0 ng/mL Final    Glucose, Fasting 08/22/2019 97  65 - 99 mg/dL Final         Plan:           Annual physical exam  Comments:  Annual physical has been performed.  No major issues except for erectile dysfunction and need for Cologuard colorectal screening.  Orders:  -     Lipid Panel; Future; Expected date: 06/23/2022  -     Hemoglobin A1C; Future; Expected date: 06/23/2022  -     PSA, Screening; Future; Expected date: 06/23/2022    Screening for human immunodeficiency virus    Screening for colon cancer  -     Cologuard Screening  (Multitarget Stool DNA); Future; Expected date: 06/29/2022    A annual physical has been done.    Blood pressure and  BMI is good.  Today on preventive examination I have discussed the following:-    1.-colon cancer screening and options include a full-fledged regular colonoscopy which is considered to be the gold standard and if normal, the next 1 will be in 10 years time.  However the cumbersome nature of this procedure including the prep and need for transportation and taking time off is problematic.    2.-Cologuard testing which is more convenient with no prep time and transportation needs.  This is considered to be approximately 90% sensitive for colon cancer detection with some degree of false-positive results.  If this test is negative, the next testing will be in 3 years time and so on.  If it is positive, it may require a diagnostic colonoscopy.  Which is different from screening colonoscopy.  While technically both the procedures are same, the billing issues are  different between screening which is completely covered and diagnostic for which you will have to meet you deductibles and co-payment.    He would like to go ahead and proceed with Cologuard testing.    Discussed about shingles vaccine also and he will reflect on those issues.    He has not had the COVID vaccine thus far but there was suspicion that he might have had COVID symptoms when his son in law and daughter were afflicted.  This was sometimes last year.    fup 1 year or earlier as needed.        Current Outpatient Medications:     glucosamine sulfate 500 mg Tab, Take 2 tablets by mouth once daily., Disp: , Rfl:     multivitamin capsule, Take 1 capsule by mouth once daily., Disp: , Rfl:     sildenafiL (VIAGRA) 50 MG tablet, Take 1 tablet (50 mg total) by mouth daily as needed for Erectile Dysfunction., Disp: 10 tablet, Rfl: 5

## 2022-06-22 ENCOUNTER — OFFICE VISIT (OUTPATIENT)
Dept: FAMILY MEDICINE | Facility: CLINIC | Age: 57
End: 2022-06-22

## 2022-06-22 VITALS
HEART RATE: 71 BPM | DIASTOLIC BLOOD PRESSURE: 76 MMHG | WEIGHT: 163 LBS | BODY MASS INDEX: 27.83 KG/M2 | SYSTOLIC BLOOD PRESSURE: 131 MMHG | HEIGHT: 64 IN

## 2022-06-22 DIAGNOSIS — N52.9 ERECTILE DYSFUNCTION, UNSPECIFIED ERECTILE DYSFUNCTION TYPE: ICD-10-CM

## 2022-06-22 DIAGNOSIS — Z00.00 ANNUAL PHYSICAL EXAM: Primary | ICD-10-CM

## 2022-06-22 DIAGNOSIS — Z12.11 SCREENING FOR COLON CANCER: ICD-10-CM

## 2022-06-22 PROCEDURE — 99396 PR PREVENTIVE VISIT,EST,40-64: ICD-10-PCS | Mod: S$PBB,,, | Performed by: INTERNAL MEDICINE

## 2022-06-22 PROCEDURE — 99213 OFFICE O/P EST LOW 20 MIN: CPT | Performed by: INTERNAL MEDICINE

## 2022-06-22 PROCEDURE — 99396 PREV VISIT EST AGE 40-64: CPT | Mod: S$PBB,,, | Performed by: INTERNAL MEDICINE

## 2022-06-22 RX ORDER — SILDENAFIL 50 MG/1
50 TABLET, FILM COATED ORAL DAILY PRN
Qty: 10 TABLET | Refills: 11 | Status: SHIPPED | OUTPATIENT
Start: 2022-06-22 | End: 2023-08-18 | Stop reason: SDUPTHER

## 2022-06-24 LAB
CHOLEST SERPL-MCNC: 217 MG/DL (ref 100–199)
HBA1C MFR BLD: 6 % (ref 4.8–5.6)
HDLC SERPL-MCNC: 56 MG/DL
LDLC SERPL CALC-MCNC: 146 MG/DL (ref 0–99)
PSA SERPL-MCNC: 1.1 NG/ML (ref 0–4)
TRIGL SERPL-MCNC: 83 MG/DL (ref 0–149)
VLDLC SERPL CALC-MCNC: 15 MG/DL (ref 5–40)

## 2022-07-06 ENCOUNTER — PATIENT MESSAGE (OUTPATIENT)
Dept: FAMILY MEDICINE | Facility: CLINIC | Age: 57
End: 2022-07-06

## 2022-07-09 ENCOUNTER — PATIENT MESSAGE (OUTPATIENT)
Dept: FAMILY MEDICINE | Facility: CLINIC | Age: 57
End: 2022-07-09

## 2022-07-14 LAB — NONINV COLON CA DNA+OCC BLD SCRN STL QL: NEGATIVE

## 2022-08-12 ENCOUNTER — TELEPHONE (OUTPATIENT)
Dept: UROLOGY | Facility: CLINIC | Age: 57
End: 2022-08-12

## 2022-08-12 ENCOUNTER — TELEPHONE (OUTPATIENT)
Dept: FAMILY MEDICINE | Facility: CLINIC | Age: 57
End: 2022-08-12

## 2022-08-12 DIAGNOSIS — R31.9 HEMATURIA, UNSPECIFIED TYPE: ICD-10-CM

## 2022-08-12 DIAGNOSIS — R30.0 DIFFICULT OR PAINFUL URINATION: Primary | ICD-10-CM

## 2022-08-12 DIAGNOSIS — R30.0 DIFFICULT OR PAINFUL URINATION: ICD-10-CM

## 2022-08-12 RX ORDER — HYDROCODONE BITARTRATE AND ACETAMINOPHEN 5; 325 MG/1; MG/1
1 TABLET ORAL EVERY 8 HOURS PRN
Qty: 15 TABLET | Refills: 0 | Status: SHIPPED | OUTPATIENT
Start: 2022-08-12 | End: 2023-05-01

## 2022-08-12 RX ORDER — TAMSULOSIN HYDROCHLORIDE 0.4 MG/1
0.4 CAPSULE ORAL DAILY
Qty: 30 CAPSULE | Refills: 1 | Status: ON HOLD | OUTPATIENT
Start: 2022-08-12 | End: 2022-09-06 | Stop reason: SDUPTHER

## 2022-08-12 RX ORDER — HYDROCODONE BITARTRATE AND IBUPROFEN 5; 200 MG/1; MG/1
1 TABLET ORAL EVERY 8 HOURS PRN
Qty: 15 TABLET | Refills: 0 | Status: SHIPPED | OUTPATIENT
Start: 2022-08-12 | End: 2022-08-12 | Stop reason: RX

## 2022-08-12 RX ORDER — TAMSULOSIN HYDROCHLORIDE 0.4 MG/1
0.4 CAPSULE ORAL DAILY
Qty: 30 CAPSULE | Refills: 1 | Status: SHIPPED | OUTPATIENT
Start: 2022-08-12 | End: 2022-08-12 | Stop reason: SDUPTHER

## 2022-08-12 NOTE — TELEPHONE ENCOUNTER
Got urgent call from patient.  He had gone to urgent care center for pinkeye and also started developing some pain while urination and difficulty urination.  Probably some blood.  Continues to have symptoms.  Has been started on Bactrim.  Not much relief.    Thus far no history of kidney stones.    Difficult or painful urination  -     HYDROcodone-ibuprofen (VICOPROFEN) 5-200 mg per tablet; Take 1 tablet by mouth every 8 (eight) hours as needed for Pain.  Dispense: 15 tablet; Refill: 0  -     tamsulosin (FLOMAX) 0.4 mg Cap; Take 1 capsule (0.4 mg total) by mouth once daily.  Dispense: 30 capsule; Refill: 1  -     Ambulatory referral/consult to Urology; Future; Expected date: 08/22/2022  -     Urinalysis; Future; Expected date: 08/12/2022  -     Urine culture; Future; Expected date: 08/12/2022  -     CBC Auto Differential; Future; Expected date: 08/12/2022  -     Basic Metabolic Panel; Future; Expected date: 08/12/2022    Hematuria, unspecified type  -     HYDROcodone-ibuprofen (VICOPROFEN) 5-200 mg per tablet; Take 1 tablet by mouth every 8 (eight) hours as needed for Pain.  Dispense: 15 tablet; Refill: 0  -     tamsulosin (FLOMAX) 0.4 mg Cap; Take 1 capsule (0.4 mg total) by mouth once daily.  Dispense: 30 capsule; Refill: 1  -     Ambulatory referral/consult to Urology; Future; Expected date: 08/22/2022  -     Urinalysis; Future; Expected date: 08/12/2022  -     Urine culture; Future; Expected date: 08/12/2022  -     CBC Auto Differential; Future; Expected date: 08/12/2022  -     Basic Metabolic Panel; Future; Expected date: 08/12/2022

## 2022-08-12 NOTE — TELEPHONE ENCOUNTER
Referral received from Dr Tomas for dx of hematuria and Bph.  Inquired about pts past urological history pt declined   psa history is noted in the system   Pt was offered appt for 8/16 @ 845 am with Dr Mcfarland   Pt accepted

## 2022-08-12 NOTE — PROGRESS NOTES
Difficult or painful urination  -     tamsulosin (FLOMAX) 0.4 mg Cap; Take 1 capsule (0.4 mg total) by mouth once daily.  Dispense: 30 capsule; Refill: 1  -     HYDROcodone-acetaminophen (NORCO) 5-325 mg per tablet; Take 1 tablet by mouth every 8 (eight) hours as needed for Pain (pain while urination). Please take stool softeners or laxatives regularly to prevent constipation on this medication  Dispense: 15 tablet; Refill: 0    Hematuria, unspecified type  -     tamsulosin (FLOMAX) 0.4 mg Cap; Take 1 capsule (0.4 mg total) by mouth once daily.  Dispense: 30 capsule; Refill: 1  -     HYDROcodone-acetaminophen (NORCO) 5-325 mg per tablet; Take 1 tablet by mouth every 8 (eight) hours as needed for Pain (pain while urination). Please take stool softeners or laxatives regularly to prevent constipation on this medication  Dispense: 15 tablet; Refill: 0    pt wants meds sent to [facility]

## 2022-08-14 ENCOUNTER — HOSPITAL ENCOUNTER (EMERGENCY)
Facility: HOSPITAL | Age: 57
Discharge: HOME | End: 2022-08-14
Attending: EMERGENCY MEDICINE

## 2022-08-14 VITALS
SYSTOLIC BLOOD PRESSURE: 140 MMHG | OXYGEN SATURATION: 96 % | HEART RATE: 87 BPM | WEIGHT: 150 LBS | BODY MASS INDEX: 25.75 KG/M2 | RESPIRATION RATE: 18 BRPM | DIASTOLIC BLOOD PRESSURE: 68 MMHG | TEMPERATURE: 101 F

## 2022-08-14 DIAGNOSIS — R31.9 HEMATURIA: ICD-10-CM

## 2022-08-14 DIAGNOSIS — R33.9 URINARY RETENTION: Primary | ICD-10-CM

## 2022-08-14 LAB
ALBUMIN SERPL BCP-MCNC: 3.4 G/DL (ref 3.5–5.2)
ALP SERPL-CCNC: 104 U/L (ref 55–135)
ALT SERPL W/O P-5'-P-CCNC: 49 U/L (ref 10–44)
ANION GAP SERPL CALC-SCNC: 10 MMOL/L (ref 8–16)
AST SERPL-CCNC: 36 U/L (ref 10–40)
BACTERIA #/AREA URNS HPF: ABNORMAL /HPF
BASOPHILS # BLD AUTO: 0.03 K/UL (ref 0–0.2)
BASOPHILS NFR BLD: 0.3 % (ref 0–1.9)
BILIRUB SERPL-MCNC: 0.4 MG/DL (ref 0.1–1)
BILIRUB UR QL STRIP: NEGATIVE
BUN SERPL-MCNC: 15 MG/DL (ref 6–20)
CALCIUM SERPL-MCNC: 9 MG/DL (ref 8.7–10.5)
CHLORIDE SERPL-SCNC: 99 MMOL/L (ref 95–110)
CLARITY UR: CLEAR
CO2 SERPL-SCNC: 20 MMOL/L (ref 23–29)
COLOR UR: YELLOW
CREAT SERPL-MCNC: 1.3 MG/DL (ref 0.5–1.4)
DIFFERENTIAL METHOD: ABNORMAL
EOSINOPHIL # BLD AUTO: 0 K/UL (ref 0–0.5)
EOSINOPHIL NFR BLD: 0.1 % (ref 0–8)
ERYTHROCYTE [DISTWIDTH] IN BLOOD BY AUTOMATED COUNT: 14.4 % (ref 11.5–14.5)
EST. GFR  (NO RACE VARIABLE): >60 ML/MIN/1.73 M^2
GLUCOSE SERPL-MCNC: 126 MG/DL (ref 70–110)
GLUCOSE UR QL STRIP: NEGATIVE
HCT VFR BLD AUTO: 36.4 % (ref 40–54)
HGB BLD-MCNC: 12.8 G/DL (ref 14–18)
HGB UR QL STRIP: ABNORMAL
IMM GRANULOCYTES # BLD AUTO: 0.04 K/UL (ref 0–0.04)
IMM GRANULOCYTES NFR BLD AUTO: 0.4 % (ref 0–0.5)
KETONES UR QL STRIP: NEGATIVE
LEUKOCYTE ESTERASE UR QL STRIP: ABNORMAL
LYMPHOCYTES # BLD AUTO: 0.1 K/UL (ref 1–4.8)
LYMPHOCYTES NFR BLD: 0.9 % (ref 18–48)
MCH RBC QN AUTO: 27.4 PG (ref 27–31)
MCHC RBC AUTO-ENTMCNC: 35.2 G/DL (ref 32–36)
MCV RBC AUTO: 78 FL (ref 82–98)
MICROSCOPIC COMMENT: ABNORMAL
MONOCYTES # BLD AUTO: 1.4 K/UL (ref 0.3–1)
MONOCYTES NFR BLD: 13.2 % (ref 4–15)
NEUTROPHILS # BLD AUTO: 8.8 K/UL (ref 1.8–7.7)
NEUTROPHILS NFR BLD: 85.1 % (ref 38–73)
NITRITE UR QL STRIP: NEGATIVE
NRBC BLD-RTO: 0 /100 WBC
PH UR STRIP: 6 [PH] (ref 5–8)
PLATELET # BLD AUTO: 239 K/UL (ref 150–450)
PMV BLD AUTO: 10.9 FL (ref 9.2–12.9)
POTASSIUM SERPL-SCNC: 3.9 MMOL/L (ref 3.5–5.1)
PROT SERPL-MCNC: 7 G/DL (ref 6–8.4)
PROT UR QL STRIP: ABNORMAL
RBC # BLD AUTO: 4.67 M/UL (ref 4.6–6.2)
RBC #/AREA URNS HPF: 50 /HPF (ref 0–4)
SARS-COV-2 RDRP RESP QL NAA+PROBE: NEGATIVE
SODIUM SERPL-SCNC: 129 MMOL/L (ref 136–145)
SP GR UR STRIP: <=1.005 (ref 1–1.03)
URN SPEC COLLECT METH UR: ABNORMAL
UROBILINOGEN UR STRIP-ACNC: NEGATIVE EU/DL
WBC # BLD AUTO: 10.29 K/UL (ref 3.9–12.7)
WBC #/AREA URNS HPF: 12 /HPF (ref 0–5)

## 2022-08-14 PROCEDURE — 36415 COLL VENOUS BLD VENIPUNCTURE: CPT | Performed by: EMERGENCY MEDICINE

## 2022-08-14 PROCEDURE — 99285 EMERGENCY DEPT VISIT HI MDM: CPT | Mod: 25

## 2022-08-14 PROCEDURE — 85025 COMPLETE CBC W/AUTO DIFF WBC: CPT | Performed by: EMERGENCY MEDICINE

## 2022-08-14 PROCEDURE — 80053 COMPREHEN METABOLIC PANEL: CPT | Performed by: EMERGENCY MEDICINE

## 2022-08-14 PROCEDURE — 51702 INSERT TEMP BLADDER CATH: CPT

## 2022-08-14 PROCEDURE — 87086 URINE CULTURE/COLONY COUNT: CPT | Performed by: EMERGENCY MEDICINE

## 2022-08-14 PROCEDURE — 25000003 PHARM REV CODE 250: Performed by: EMERGENCY MEDICINE

## 2022-08-14 PROCEDURE — 96365 THER/PROPH/DIAG IV INF INIT: CPT

## 2022-08-14 PROCEDURE — 81000 URINALYSIS NONAUTO W/SCOPE: CPT | Performed by: EMERGENCY MEDICINE

## 2022-08-14 PROCEDURE — 96361 HYDRATE IV INFUSION ADD-ON: CPT

## 2022-08-14 PROCEDURE — 63600175 PHARM REV CODE 636 W HCPCS: Performed by: EMERGENCY MEDICINE

## 2022-08-14 PROCEDURE — U0002 COVID-19 LAB TEST NON-CDC: HCPCS | Performed by: EMERGENCY MEDICINE

## 2022-08-14 RX ORDER — CIPROFLOXACIN 500 MG/1
500 TABLET ORAL 2 TIMES DAILY
Qty: 20 TABLET | Refills: 0 | Status: SHIPPED | OUTPATIENT
Start: 2022-08-14 | End: 2022-08-24

## 2022-08-14 RX ORDER — ACETAMINOPHEN 500 MG
1000 TABLET ORAL
Status: COMPLETED | OUTPATIENT
Start: 2022-08-14 | End: 2022-08-14

## 2022-08-14 RX ORDER — SODIUM CHLORIDE 9 MG/ML
INJECTION, SOLUTION INTRAVENOUS
Status: COMPLETED | OUTPATIENT
Start: 2022-08-14 | End: 2022-08-14

## 2022-08-14 RX ORDER — LIDOCAINE HYDROCHLORIDE 20 MG/ML
JELLY TOPICAL
Status: COMPLETED | OUTPATIENT
Start: 2022-08-14 | End: 2022-08-14

## 2022-08-14 RX ADMIN — CEFTRIAXONE 1 G: 1 INJECTION, SOLUTION INTRAVENOUS at 03:08

## 2022-08-14 RX ADMIN — ACETAMINOPHEN 1000 MG: 500 TABLET ORAL at 04:08

## 2022-08-14 RX ADMIN — SODIUM CHLORIDE: 0.9 INJECTION, SOLUTION INTRAVENOUS at 01:08

## 2022-08-14 RX ADMIN — LIDOCAINE HYDROCHLORIDE 20 ML: 20 JELLY TOPICAL at 03:08

## 2022-08-14 NOTE — ED NOTES
Pt went home with monroe catheter connected to leg bag. Educated/Demonstrated to pt how to take care monroe cath, change to regular bag or leg bag and how to emptied the bag. Wash hand and keep aseptic technique. Pt verbalized understanding.

## 2022-08-14 NOTE — ED NOTES
Pt having trouble voiding, informed Dr. Up with order. Tried to place fr 18 catheter failed, pt urethral meatus is constricted, tried coude Fr 14, successfully inserted monroe cath using sterile technique, urine noted.

## 2022-08-14 NOTE — ED PROVIDER NOTES
Encounter Date: 8/14/2022       History     Chief Complaint   Patient presents with    Dysuria     X 2 weeks    Fever     Chief complaint:  Burning urination    HPI:  56-year-old male presents with a 1 week history of dysuria and a 4 day history of fever.  He was seen at urgent care and diagnosed with UTI treated with Bactrim.  He denies any history of previous UTI or ureterolithiasis.  He does report a sensation of fullness in the perineum which he suspects represents a prostate infection.  He has no headache or neck stiffness and denies any cough shortness of breath.  He does report eye redness.  He has no history of recent diarrheal illness.  Past medical history is significant for hyperlipidemia and psoriasis.        Review of patient's allergies indicates:   Allergen Reactions    No known drug allergies      Past Medical History:   Diagnosis Date    Hyperlipidemia     Psoriasis      Past Surgical History:   Procedure Laterality Date    SKIN CANCER EXCISION      TONSILLECTOMY       Family History   Problem Relation Age of Onset    Hypertension Mother     Diabetes Mother     Macular degeneration Mother     Bell's palsy Mother     Cancer Father         liver    Alcohol abuse Father     HIV Maternal Uncle     Asthma Paternal Aunt     Cancer Maternal Grandfather         lung/ smoker     Social History     Tobacco Use    Smoking status: Never Smoker    Smokeless tobacco: Never Used   Substance Use Topics    Alcohol use: Yes     Alcohol/week: 2.0 - 4.0 standard drinks     Types: 1 - 2 Cans of beer, 1 - 2 Shots of liquor per week     Comment: 1-2 per week    Drug use: No     Review of Systems   Constitutional: Negative for activity change, appetite change, chills, fatigue and fever.   Eyes: Negative for visual disturbance.   Respiratory: Negative for apnea and shortness of breath.    Cardiovascular: Negative for chest pain and palpitations.   Gastrointestinal: Negative for abdominal distention and  abdominal pain.   Genitourinary: Positive for dysuria, frequency and urgency. Negative for difficulty urinating, flank pain, hematuria, penile discharge, penile swelling and scrotal swelling.   Musculoskeletal: Negative for neck pain.   Skin: Negative for pallor and rash.   Neurological: Negative for headaches.   Hematological: Does not bruise/bleed easily.   Psychiatric/Behavioral: Negative for agitation.       Physical Exam     Initial Vitals [08/14/22 1256]   BP Pulse Resp Temp SpO2   127/65 97 20 (!) 102.1 °F (38.9 °C) 95 %      MAP       --         Physical Exam    Nursing note and vitals reviewed.  Constitutional: He appears well-developed and well-nourished.   HENT:   Head: Normocephalic and atraumatic.   Eyes: Conjunctivae are normal.   Neck: Neck supple.   Normal range of motion.  Cardiovascular: Normal rate, regular rhythm and normal heart sounds. Exam reveals no gallop and no friction rub.    No murmur heard.  Pulmonary/Chest: Breath sounds normal. No respiratory distress. He has no wheezes. He has no rhonchi. He has no rales.   Abdominal: Abdomen is soft. He exhibits no distension. There is no abdominal tenderness.   Musculoskeletal:         General: Normal range of motion.      Cervical back: Normal range of motion and neck supple.     Neurological: He is alert and oriented to person, place, and time.   Skin: Skin is warm and dry.   Psychiatric: He has a normal mood and affect.         ED Course   Procedures  Labs Reviewed   COMPREHENSIVE METABOLIC PANEL - Abnormal; Notable for the following components:       Result Value    Sodium 129 (*)     CO2 20 (*)     Glucose 126 (*)     Albumin 3.4 (*)     ALT 49 (*)     All other components within normal limits   CBC W/ AUTO DIFFERENTIAL - Abnormal; Notable for the following components:    Hemoglobin 12.8 (*)     Hematocrit 36.4 (*)     MCV 78 (*)     Gran # (ANC) 8.8 (*)     Lymph # 0.1 (*)     Mono # 1.4 (*)     Gran % 85.1 (*)     Lymph % 0.9 (*)     All  other components within normal limits   URINALYSIS, REFLEX TO URINE CULTURE - Abnormal; Notable for the following components:    Specific Gravity, UA <=1.005 (*)     Protein, UA Trace (*)     Occult Blood UA 3+ (*)     Leukocytes, UA 2+ (*)     All other components within normal limits    Narrative:     Specimen Source->Urine   URINALYSIS MICROSCOPIC - Abnormal; Notable for the following components:    RBC, UA 50 (*)     WBC, UA 12 (*)     All other components within normal limits    Narrative:     Specimen Source->Urine   CULTURE, URINE   SARS-COV-2 RNA AMPLIFICATION, QUAL          Imaging Results          CT Renal Stone Study ABD Pelvis WO (Final result)  Result time 08/14/22 15:41:09    Final result by Arin Weir MD (08/14/22 15:41:09)                 Impression:      There is no evidence hydronephrosis or obstructing renal calculus.  There are left peripelvic cysts.  The patient has a Varner catheter in place with air seen within the bladder.      Electronically signed by: Arin Weir MD  Date:    08/14/2022  Time:    15:41             Narrative:    EXAMINATION:  CT RENAL STONE STUDY ABD PELVIS WO    CLINICAL HISTORY:  Bladder-neck obstruction; Hematuria, unspecified    TECHNIQUE:  Low dose axial images, sagittal and coronal reformations were obtained from the lung bases to the pubic symphysis.  Contrast was not administered.    COMPARISON:  None    FINDINGS:  The liver, spleen, adrenal glands and pancreas are unremarkable.    There is no evidence renal calculus or hydronephrosis.  There are left peripelvic cysts.    The gallbladder is in place.    There is no evidence bowel obstruction.  There is no evidence right lower quadrant abdominal inflammation.    There is no evidence intra-abdominal free fluid or free air.  There is no evidence of abdominal nor pelvic lymphadenopathy.    The osseous structures are unremarkable.  There is a Varner catheter in the bladder with air seen in the bladder.                                  Medications   0.9%  NaCl infusion ( Intravenous Stopped 8/14/22 1506)   cefTRIAXone (ROCEPHIN) 1 g/50 mL D5W IVPB (0 g Intravenous Stopped 8/14/22 1546)   LIDOcaine HCl 2% urojet (20 mLs Mucous Membrane Given 8/14/22 1500)   acetaminophen tablet 1,000 mg (1,000 mg Oral Given 8/14/22 1603)     Medical Decision Making:   ED Management:  56-year-old male presents with a 1 week history of dysuria and a 4 day history of fever.  He had no relief with Bactrim.  Varner catheter is placed with a describe resistance in the urethra.  A 14 coude was eventually placed with 600 cc of urine removed.  Catheter remained in place.  Due to the fever he is treated empirically with Cipro after receiving 1 dose of ceftriaxone.  He is referred to Urology for further management of his urethral stricture.                      Clinical Impression:   Final diagnoses:  [R31.9] Hematuria  [R33.9] Urinary retention (Primary)          ED Disposition Condition    Discharge Stable        ED Prescriptions     Medication Sig Dispense Start Date End Date Auth. Provider    ciprofloxacin HCl (CIPRO) 500 MG tablet Take 1 tablet (500 mg total) by mouth 2 (two) times daily. for 10 days 20 tablet 8/14/2022 8/24/2022 Pola Up III, MD        Follow-up Information     Follow up With Specialties Details Why Contact Info    Alfredo Asif MD Urology In 3 days  [address] [address]  [address]  [place] [address]  [phone]             Pola Up III, MD  08/14/22 9570

## 2022-08-16 ENCOUNTER — OFFICE VISIT (OUTPATIENT)
Dept: UROLOGY | Facility: CLINIC | Age: 57
End: 2022-08-16

## 2022-08-16 ENCOUNTER — LAB VISIT (OUTPATIENT)
Dept: LAB | Facility: HOSPITAL | Age: 57
End: 2022-08-16
Attending: UROLOGY

## 2022-08-16 VITALS
WEIGHT: 151 LBS | SYSTOLIC BLOOD PRESSURE: 143 MMHG | BODY MASS INDEX: 25.78 KG/M2 | HEIGHT: 64 IN | DIASTOLIC BLOOD PRESSURE: 90 MMHG | HEART RATE: 87 BPM

## 2022-08-16 DIAGNOSIS — R33.9 URINARY RETENTION: Primary | ICD-10-CM

## 2022-08-16 DIAGNOSIS — R31.0 GROSS HEMATURIA: ICD-10-CM

## 2022-08-16 DIAGNOSIS — R50.9 FEVER, UNSPECIFIED FEVER CAUSE: ICD-10-CM

## 2022-08-16 DIAGNOSIS — R30.0 DIFFICULT OR PAINFUL URINATION: ICD-10-CM

## 2022-08-16 DIAGNOSIS — R31.9 HEMATURIA, UNSPECIFIED TYPE: ICD-10-CM

## 2022-08-16 LAB
BACTERIA UR CULT: NO GROWTH
BASOPHILS # BLD AUTO: 0.02 K/UL (ref 0–0.2)
BASOPHILS NFR BLD: 0.3 % (ref 0–1.9)
DIFFERENTIAL METHOD: ABNORMAL
EOSINOPHIL # BLD AUTO: 0.1 K/UL (ref 0–0.5)
EOSINOPHIL NFR BLD: 0.9 % (ref 0–8)
ERYTHROCYTE [DISTWIDTH] IN BLOOD BY AUTOMATED COUNT: 15 % (ref 11.5–14.5)
HCT VFR BLD AUTO: 43 % (ref 40–54)
HGB BLD-MCNC: 14.5 G/DL (ref 14–18)
IMM GRANULOCYTES # BLD AUTO: 0.03 K/UL (ref 0–0.04)
IMM GRANULOCYTES NFR BLD AUTO: 0.4 % (ref 0–0.5)
LYMPHOCYTES # BLD AUTO: 0.1 K/UL (ref 1–4.8)
LYMPHOCYTES NFR BLD: 0.9 % (ref 18–48)
MCH RBC QN AUTO: 27.1 PG (ref 27–31)
MCHC RBC AUTO-ENTMCNC: 33.7 G/DL (ref 32–36)
MCV RBC AUTO: 80 FL (ref 82–98)
MONOCYTES # BLD AUTO: 0.9 K/UL (ref 0.3–1)
MONOCYTES NFR BLD: 10.8 % (ref 4–15)
NEUTROPHILS # BLD AUTO: 6.8 K/UL (ref 1.8–7.7)
NEUTROPHILS NFR BLD: 86.7 % (ref 38–73)
NRBC BLD-RTO: 0 /100 WBC
PLATELET # BLD AUTO: 327 K/UL (ref 150–450)
PMV BLD AUTO: 11.1 FL (ref 9.2–12.9)
RBC # BLD AUTO: 5.36 M/UL (ref 4.6–6.2)
WBC # BLD AUTO: 7.85 K/UL (ref 3.9–12.7)

## 2022-08-16 PROCEDURE — 99999 PR PBB SHADOW E&M-EST. PATIENT-LVL V: CPT | Mod: PBBFAC,,, | Performed by: UROLOGY

## 2022-08-16 PROCEDURE — 99205 PR OFFICE/OUTPT VISIT, NEW, LEVL V, 60-74 MIN: ICD-10-PCS | Mod: S$PBB,,, | Performed by: UROLOGY

## 2022-08-16 PROCEDURE — 99999 PR PBB SHADOW E&M-EST. PATIENT-LVL V: ICD-10-PCS | Mod: PBBFAC,,, | Performed by: UROLOGY

## 2022-08-16 PROCEDURE — 99215 OFFICE O/P EST HI 40 MIN: CPT | Mod: PBBFAC,PN | Performed by: UROLOGY

## 2022-08-16 PROCEDURE — 85025 COMPLETE CBC W/AUTO DIFF WBC: CPT | Performed by: UROLOGY

## 2022-08-16 PROCEDURE — 99417 PR PROLONGED SVC, OUTPT, W/WO DIRECT PT CONTACT,  EA ADDTL 15 MIN: ICD-10-PCS | Mod: S$PBB,,, | Performed by: UROLOGY

## 2022-08-16 PROCEDURE — 99205 OFFICE O/P NEW HI 60 MIN: CPT | Mod: S$PBB,,, | Performed by: UROLOGY

## 2022-08-16 PROCEDURE — 36415 COLL VENOUS BLD VENIPUNCTURE: CPT | Performed by: UROLOGY

## 2022-08-16 PROCEDURE — 99417 PROLNG OP E/M EACH 15 MIN: CPT | Mod: S$PBB,,, | Performed by: UROLOGY

## 2022-08-16 PROCEDURE — 96372 THER/PROPH/DIAG INJ SC/IM: CPT | Mod: PBBFAC,PN

## 2022-08-16 PROCEDURE — 87040 BLOOD CULTURE FOR BACTERIA: CPT | Mod: 59 | Performed by: UROLOGY

## 2022-08-16 RX ORDER — GENTAMICIN SULFATE 40 MG/ML
80 INJECTION, SOLUTION INTRAMUSCULAR; INTRAVENOUS
Status: COMPLETED | OUTPATIENT
Start: 2022-08-16 | End: 2022-08-16

## 2022-08-16 RX ORDER — ACETAMINOPHEN 500 MG
500 TABLET ORAL EVERY 6 HOURS PRN
COMMUNITY

## 2022-08-16 RX ORDER — CEFTRIAXONE 1 G/1
1 INJECTION, POWDER, FOR SOLUTION INTRAMUSCULAR; INTRAVENOUS
Status: COMPLETED | OUTPATIENT
Start: 2022-08-16 | End: 2022-08-16

## 2022-08-16 RX ADMIN — CEFTRIAXONE SODIUM 1 G: 1 INJECTION, POWDER, FOR SOLUTION INTRAMUSCULAR; INTRAVENOUS at 10:08

## 2022-08-16 RX ADMIN — GENTAMICIN SULFATE 80 MG: 40 INJECTION, SOLUTION INTRAMUSCULAR; INTRAVENOUS at 10:08

## 2022-08-16 NOTE — PATIENT INSTRUCTIONS
"Complete course of cipro  Then when complete, finish the doxycycline  Restart and continue flomax 0.4mg nightly  At least in next 5 days schedule advil/motrin/ibuprofen 400-600mg 3x/d with meals  If T goes above 101, return the to the ER  Monday "fill and pull" voiding trial/catheter removal  9/6/22 prostate ultrasound and cystoscope at [facility]    "

## 2022-08-16 NOTE — PROGRESS NOTES
[facility] New Patient/H&P:     Brandon Suazo Sr. is a 56 y.o. male who presents for evaluation of dysuria and difficulty voiding at referral of Dr Tomas    Last routine health main visit 2022 noting ED on viagra, no bothersome LUTS, nocturia x1, PSA 1.1 (from 1.2 in 2019)  Notified by Dr Tomas on  pt called in with difficult or painful urination, hematuria, and needed asap appt. Booked for today  On review he was started on flomax by Dr Tomas later that afternoon .  He did present to ER on  notin week history of dysuria and a 4 day history of fever.  He was seen at urgent care and diagnosed with UTI treated with Bactrim.  He denies any history of previous UTI or ureterolithiasis.  He does report a sensation of fullness in the perineum which he suspects represents a prostate infection.  He has no headache or neck stiffness and denies any cough shortness of breath.  He does report eye redness.  He has no history of recent diarrheal illness.  T 102.1 in ER, UA micro 50 rbc, 12 wbc. Given rocpehin and changed from bactrim to cipro. WBC 10.29 (though on review has L shift 85% granulocytes, with prior WBCs on file 3.5-4.9 historically)  Varner catheter placed with noted resistance in the urethra.  A 14 coude was eventually placed with 600 cc of urine removed.    CT negative. No stones or other concerns    He presents today noting.  22 [facility] for burning with urination and redness in right eye x4d. Had been gradual onset over weekend.  Possible concern for STI given R eye inflam and burning sensation. Reports he declined STI testing due to cost and asked to be empirically treated and was given rocephin and doxycyline. Advised may have UTI. He reports partner of concern had negative testing same day full panel which he viewed negative results of  - on record review, no urine checked or UA. Had trichimonas testing only, which was negative. 1g IM rocephin, and Rx for  doxycycline and tobradex ophthalmic.   Tuesday started having fever subjectively, but peaked towards end of week at 102.3 Friday, which would break with extra strength tylenol.  Wednesday 8/10/22 started seeing traces of blood in urine, and returned to [facility] urgent care concerned about continued dysuria and progressive symptoms with fever/hematuria, and not much change in urinary symptoms. - on record review UA was negative except 2+ blood. No culture sent. Advised partially treated given no wbc/nits but given symptoms 14d course of Bactrim Rxed and tobradex refilled. Advised of possibility of Shital syndrome as again deferred STI teting noting partner had neg panel x3 at [facility] urgent care earlier in week. No urine culture sent due to cost concern  Pt reports after starting bactrim, he stopped doxycycline.  As above fever progressed through week, peaking at 102.3 on Friday at home, and his fever and dysuria persisted  He contacted PCP Friday 8/12 noting pain and progressive difficulty urinating and was given norco and flomax  By Sunday 8/14 no improvement and doubled flomax to BID, and then presented to ER as above and was able to void small amount on arrival before being unable to void and monroe placed in 600cc retention as above, and DCed home despite 102 fever (covid neg). And given symptoms persistence on bactrim, changed to cipro.  Never took more than 2d doxy, 4d bactrim, and has continued cipro  Still had subj fever and chills/sweats nightsweats last night and this was after 1g tylenol. Planned to take another dose this morning but was 98.6 so didn't take tylenol. Took cipro at 8am.   Still has lingering background headache. Bladder feels more comfortable for a change. Less pressure.  Having BMs - multiple - no issues. Did take some dulcolax during this but now ok. Pain and pressure along monroe during BM including pressure to urinate and slight leak around monroe  Was having sig SP pressure during episode  which subsided as of today  T 99.2 in clinic now  Has not taken his Flomax since the Varner catheter was placed        Past Medical History:   Diagnosis Date    Hyperlipidemia     Psoriasis        Past Surgical History:   Procedure Laterality Date    SKIN CANCER EXCISION      TONSILLECTOMY         Family History   Problem Relation Age of Onset    Hypertension Mother     Diabetes Mother     Macular degeneration Mother     Bell's palsy Mother     Cancer Father         liver    Alcohol abuse Father     HIV Maternal Uncle     Asthma Paternal Aunt     Cancer Maternal Grandfather         lung/ smoker       Social History     Socioeconomic History    Marital status:     Number of children: 4   Occupational History    Occupation: Construction- [employer]     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   Social History Narrative     GGBG        G- 33    G-31    B- 27    G 22     Social Determinants of Health     Financial Resource Strain: Low Risk     Difficulty of Paying Living Expenses: Not hard at all   Transportation Needs: No Transportation Needs    Lack of Transportation (Medical): No    Lack of Transportation (Non-Medical): No   Physical Activity: Sufficiently Active    Days of Exercise per Week: 5 days    Minutes of Exercise per Session: 30 min   Stress: No Stress Concern Present    Feeling of Stress : Only a little       Review of patient's allergies indicates:   Allergen Reactions    No known drug allergies        Medications Reviewed: see MAR    Focused Physical Exam    Vitals:    08/16/22 0845   BP:  "(!) 143/90   Pulse: 87     Body mass index is 25.92 kg/m². Weight: 68.5 kg (151 lb 0.2 oz) Height: 5' 4" (162.6 cm)       Abdomen: Soft, non-tender, nondistended, no CVA tenderness  :  Normal phallus and bilat desc normal testes in normal scrotum with 14fr 30cc coude monroe in place draining clear yellow urine to large bag via leg bag extension tubing      LABS:    Recent Results (from the past 336 hour(s))   COVID-19 Rapid Screening    Collection Time: 08/14/22  1:10 PM   Result Value Ref Range    SARS-CoV-2 RNA, Amplification, Qual Negative Negative   Urinalysis, Reflex to Urine Culture Urine, Clean Catch    Collection Time: 08/14/22  1:10 PM    Specimen: Urine   Result Value Ref Range    Specimen UA Urine, Clean Catch     Color, UA Yellow Yellow, Straw, [place]    Appearance, UA Clear Clear    pH, UA 6.0 5.0 - 8.0    Specific Gravity, UA <=1.005 (A) 1.005 - 1.030    Protein, UA Trace (A) Negative    Glucose, UA Negative Negative    Ketones, UA Negative Negative    Bilirubin (UA) Negative Negative    Occult Blood UA 3+ (A) Negative    Nitrite, UA Negative Negative    Urobilinogen, UA Negative <2.0 EU/dL    Leukocytes, UA 2+ (A) Negative   Urinalysis Microscopic    Collection Time: 08/14/22  1:10 PM   Result Value Ref Range    RBC, UA 50 (H) 0 - 4 /hpf    WBC, UA 12 (H) 0 - 5 /hpf    Bacteria Occasional None-Occ /hpf    Microscopic Comment SEE COMMENT    Urine culture    Collection Time: 08/14/22  1:10 PM    Specimen: Urine   Result Value Ref Range    Urine Culture, Routine No growth    Comprehensive Metabolic Panel    Collection Time: 08/14/22  1:18 PM   Result Value Ref Range    Sodium 129 (L) 136 - 145 mmol/L    Potassium 3.9 3.5 - 5.1 mmol/L    Chloride 99 95 - 110 mmol/L    CO2 20 (L) 23 - 29 mmol/L    Glucose 126 (H) 70 - 110 mg/dL    BUN 15 6 - 20 mg/dL    Creatinine 1.3 0.5 - 1.4 mg/dL    Calcium 9.0 8.7 - 10.5 mg/dL    Total Protein 7.0 6.0 - 8.4 g/dL    Albumin 3.4 (L) 3.5 - 5.2 g/dL    Total Bilirubin 0.4 " 0.1 - 1.0 mg/dL    Alkaline Phosphatase 104 55 - 135 U/L    AST 36 10 - 40 U/L    ALT 49 (H) 10 - 44 U/L    Anion Gap 10 8 - 16 mmol/L    eGFR >60 >60 mL/min/1.73 m^2   CBC Auto Differential    Collection Time: 08/14/22  1:18 PM   Result Value Ref Range    WBC 10.29 3.90 - 12.70 K/uL    RBC 4.67 4.60 - 6.20 M/uL    Hemoglobin 12.8 (L) 14.0 - 18.0 g/dL    Hematocrit 36.4 (L) 40.0 - 54.0 %    MCV 78 (L) 82 - 98 fL    MCH 27.4 27.0 - 31.0 pg    MCHC 35.2 32.0 - 36.0 g/dL    RDW 14.4 11.5 - 14.5 %    Platelets 239 150 - 450 K/uL    MPV 10.9 9.2 - 12.9 fL    Immature Granulocytes 0.4 0.0 - 0.5 %    Gran # (ANC) 8.8 (H) 1.8 - 7.7 K/uL    Immature Grans (Abs) 0.04 0.00 - 0.04 K/uL    Lymph # 0.1 (L) 1.0 - 4.8 K/uL    Mono # 1.4 (H) 0.3 - 1.0 K/uL    Eos # 0.0 0.0 - 0.5 K/uL    Baso # 0.03 0.00 - 0.20 K/uL    nRBC 0 0 /100 WBC    Gran % 85.1 (H) 38.0 - 73.0 %    Lymph % 0.9 (L) 18.0 - 48.0 %    Mono % 13.2 4.0 - 15.0 %    Eosinophil % 0.1 0.0 - 8.0 %    Basophil % 0.3 0.0 - 1.9 %    Differential Method Automated          Assessment/Diagnosis:    1. Urinary retention  Case Request Operating Room: CYSTOSCOPY, ULTRASOUND, RECTAL APPROACH    Place in Outpatient    gentamicin injection 80 mg    cefTRIAXone injection 1 g   2. Fever, unspecified fever cause  CBC Auto Differential    Blood culture    Blood culture    gentamicin injection 80 mg    cefTRIAXone injection 1 g   3. Gross hematuria  gentamicin injection 80 mg    cefTRIAXone injection 1 g   4. Difficult or painful urination  Ambulatory referral/consult to Urology   5. Hematuria, unspecified type  Ambulatory referral/consult to Urology       Plans:  Extensively review details of emergency department visit as above including labs and imaging and urine, as well as requested and reviewed all urgent care visit notes and treatment plans.  His initial concern given conjunctivitis and dysuria for STI has abated given negative partner testing which he has visualize, and notes that  he was treated empirically.  However though he received a g of Rocephin at the urgent care, he only took 2 days of his course of doxycycline before switching to Bactrim, which has then been switched to Cipro, so he did not complete treatment, and again reiterated the points made by urgent care about undiagnosed Shital syndrome etcetera, as well as GC/CT as his significant contributory source to prostatitis, however there can also be bacterial prostatitis, and most suspicious of prostatitis versus UTI as the source of his discomfort and retention, especially if left untreated and either smaller did 1 could be get the other.  However there was no urine on initial presentation before I am and p.o. antibiotics, and days later there was only urinalysis dipstick with 2+ blood.  His urine culture on time of ER visit was negative despite white blood cells and red blood cells on urinalysis, and in the absence of urinary calculus disease, it is possible the urine culture is treated given the multiple antibiotics he was on, or possible that with some white and red cells in the urine in the setting of negative urine culture there is clinical prostatitis.  Very difficult to determine the underlying etiology given his continued changes in treatment course and incomplete testing, which is extensively reviewed with him today.  However, in the setting of acute urinary retention, with persistent fever, advised could not remove Varner catheter today pending appropriate management and treatment plan.     Any infection present would need to be cleared her at least treated appropriately for a 5-7 day course as well as alpha blockers for 5-7 days from point of acute urinary retention before attempting voiding trial.  He has not taken his Flomax since the Varner catheter has been in place, and has only recently been on a treatment course of Cipro for 2 days.  I advised him to continue and complete the current course of Cipro, and when he is  done with that to go ahead and complete the course of doxycycline that he had, as these of the 2 best antibiotics with prostatic tissue penetrance to treat prostatitis which is the best underlying etiology given his symptom complex.  Did however discuss that concerns for untreated prostatitis with significant fevers 102 for days in a row, and persistent low-grade temperature despite IV, IM, and p.o. antibiotics, has concerns for sepsis and bacteremia.  No blood cultures were drawn in the emergency department with a fever of 102, with negative COVID, and negative urine culture.    With smoldering acute prostatitis on the top of the differential at this time, will continue current course of antibiotics, and he was given another 1 g IM of Rocephin in clinic today as well as 80 mg IM gentamicin.  He did have increasing white blood cell count, with left shift at time of ER visit, and has persistent low-grade temperature of 99.2° with night sweats and chills as recently as last night despite 1 g of Tylenol use.  I will go ahead and sent him to the lab on the way out for repeat CBC as well as blood cultures x2.  I would expect at this time given the significant amount of antibiotics utilized, blood cultures would be negative but certainly if they are positive this would generate return to the hospital and admission for IV antibiotics.  As well, if he spiked a fever greater than 101 at any point he should return to the emergency department and be admitted for IV antibiotics, otherwise he seems to be clinically responding to his course of p.o. antibiotics and we did add IM antibiotics today in clinic so he should continue with this course.  As well, advised him to schedule anti-inflammatories such as ibuprofen 400-600 mg t.i.d. with meals in the next 5-7 days to help decrease acute inflammation.    After 1 week of indwelling Varner catheter with alpha blockers, appropriate antibiotic treatment, and anti-inflammatories,  assuming that his blood cultures are negative, and white blood cell count is normalizing, will return for fill and pull voiding trial.  Described this in detail to the patient today for which he will continue Flomax afterwards.  We did also discuss that in the setting of acute urinary retention, even if he is able to pass a voiding trial and avoid replacing Varner catheter, this does mandate lower tract workup for obstruction and if any obstruction is found (Ie urethral stricture, or more commonly prostatic obstruction, of which obstruction can predispose inflammation such as prostatitis) this would be indication for relief of obstruction.  I suspect obstructive underlying etiology despite previously reported minimal LUTS and normal PSA, especially given this clinical course, retention, and difficulty noted passing Varner catheter in the emergency department.    Once he has completed all Cipro and followed by the remainder of his course of doxycycline and continued Flomax, whether or not he has passed a fill and pull voiding trial next week, have planned lower tract evaluation with cystoscopy and prostate ultrasound guided volumetric measurement of prostate to help evaluate for underlying obstruction and guide further recommendations.  This was scheduled on 9/6/22 at the [facility].    All questions patient had were answered in detail, and he was agreeable to treatment plan.  Patient instructions provided below:    As well, we did also have extensive discussion about including all relevant points of his HPI in his medical record, noting that only his medical providers have access to his medical record and if anyone else, certainly relatives, were to access his medical record, it would be grounds for their dismissal, and therefore his health information is protected in his medical record and visible only to himself, his medical providers, and anyone whom he himself grants access to his patient portal.  Therefore despite his  "multiple requests I would omit any relevant portions of the above history, which was ultimately acceptable to him after this explanation of HIPAA/PHI and routine medical practice and medical record access    AVS instructions  Complete course of cipro  Then when complete, finish the doxycycline  Restart and continue flomax 0.4mg nightly  At least in next 5 days schedule advil/motrin/ibuprofen 400-600mg 3x/d with meals  If T goes above 101, return the to the ER  Monday "fill and pull" voiding trial/catheter removal  9/6/22 prostate ultrasound and cystoscope at [facility]    Total time spent in/on encounter today, including face to face time with patient, counseling, medical record review, interpretation of tests/results, , and treatment plan coordination: 95 minutes  "

## 2022-08-16 NOTE — PROGRESS NOTES
Patient arrived in clinic today for 80 mg of Gentamicin and 1 gram of Rocephin  2 patient identifiers verified.  Genatmicin 80 mg IM to right glut.   Rocephin 1 g IM to left glut.   Patient tolerated injection well. No adverse reaction noted.  Patient left clinic in satisfactory condition.

## 2022-08-16 NOTE — Clinical Note
Well when you mentioned him Friday there were some details missing from the story!! I think he withheld some from you too... Such as he was already having 102 fevers at home on Friday And I cant believe with 102.3 in er on Sunday they sent him home!

## 2022-08-17 NOTE — PROGRESS NOTES
Can advise that WBC count has slightly improved though still has elevated granulocytes/left shift indicating acute infection as is evidenced by symptoms and low grade fever. Blood cultures pending and will result every 24 hours. Can review any treatment plans and remind that if he has continued nightsweats, T 101 or greater despite meds he should return to the ER for admission and IV antibiotics. As well if blood cultures are positive, will be directed back for admission

## 2022-08-18 ENCOUNTER — TELEPHONE (OUTPATIENT)
Dept: UROLOGY | Facility: CLINIC | Age: 57
End: 2022-08-18

## 2022-08-18 NOTE — TELEPHONE ENCOUNTER
Spoke w pt who voiced he is continuing to have night sweats but is taking tylenol around the clock.   pt voiced that since symptoms are continuing will moniter temp until after lunch and then go into ER for further care .Due to no change in symptoms.  Agree with pt given prior recommendations by MD   Pt informed MD will be informed as in surgery will call with any further recommendations if need be.

## 2022-08-18 NOTE — TELEPHONE ENCOUNTER
----- Message from Yoana Hughes sent at 8/18/2022 10:36 AM CDT -----  Contact: Brandon quentin [phone]  [email or web address]     Patient is calling for Medical Advice regarding:    How long has patient had these symptoms:    Pharmacy name and phone#:    Would like response via mobicanvast:call back    Comments: Pt is requesting a call back from Christine regarding some symptoms that he is having

## 2022-08-19 NOTE — TELEPHONE ENCOUNTER
Blood cultures are negative x3d (expected given abx use) but if still having fevers despite all his antibiotics and antipyretics, agree with return to ER for reeval and poss CT to eval for prostate abscess or other sources of his symptom complex with continued fever

## 2022-08-19 NOTE — TELEPHONE ENCOUNTER
Spoke w pt and informed of blood culture results   Pt voiced that he did not proceed to ER yesterday as he feels better and feels he is turning a corner and will be present for visit on Monday   Informed pt if any change to proceed to ER as previously discussed voiced ok

## 2022-08-21 LAB
BACTERIA BLD CULT: NORMAL
BACTERIA BLD CULT: NORMAL

## 2022-08-22 ENCOUNTER — CLINICAL SUPPORT (OUTPATIENT)
Dept: UROLOGY | Facility: CLINIC | Age: 57
End: 2022-08-22

## 2022-08-22 DIAGNOSIS — R33.9 URINARY RETENTION: Primary | ICD-10-CM

## 2022-08-22 PROCEDURE — 99499 UNLISTED E&M SERVICE: CPT | Mod: S$PBB,,, | Performed by: UROLOGY

## 2022-08-22 PROCEDURE — 51700 IRRIGATION OF BLADDER: CPT | Mod: PBBFAC,PN

## 2022-08-22 PROCEDURE — 51700 IRRIGATION OF BLADDER: CPT | Mod: S$PBB,,, | Performed by: UROLOGY

## 2022-08-22 PROCEDURE — 99499 NO LOS: ICD-10-PCS | Mod: S$PBB,,, | Performed by: UROLOGY

## 2022-08-22 PROCEDURE — 51700 PR IRRIGATION, BLADDER: ICD-10-PCS | Mod: S$PBB,,, | Performed by: UROLOGY

## 2022-08-22 NOTE — PROGRESS NOTES
Patient here today for Fill and Pull.   ?  10 ml removed from catheter balloon.  Bladder filled with 350 ml sterile water via catheter tip syringe.  Catheter removed with no difficulty.   ?  Patient voided 380 ml into urinal.   Patient educated to drink plenty of water.  Informed if unable to urinate RTC by 2:30/3pm or report of ER after hours.  Patient voiced understanding of instructions given.    Patient left office in satisfactory condition.

## 2022-09-01 NOTE — DISCHARGE INSTRUCTIONS
After the procedure    Drink plenty of fluids.  You may have burning or light bleeding when you urinate--this is normal.  Medications may be prescribed to ease any discomfort or prevent infection. Take these as directed.  Call your doctor if you have heavy bleeding or blood clots, burning that lasts more than a day, a fever over 100°F  (38° C), or trouble urinating.    After Surgery:  Always be aware that any surgery can cause these symptoms:    Pain- Medication can be prescribed for pain to decrease your pain but may not completely take your pain away.  Over the Counter pain medicine my be enough and you can always use Ice and rest to help ease pain.    Bleeding- a little bleeding after a surgery is usually within normal.  If there is a lot of blood you need to notify your MD.  Emergency treatments of bleeding are cold application, elevation of the bleeding site and compression.    Infection- Infection after surgery is NOT a normal occurrence.  Signs of infection are fever, swelling, hot to touch the incision.  If this occurs notify your MD immediately.    Nausea- this can be common after a surgery especially if you have had anesthesia medicine or are taking pain medicine.  Staying on clear liquids, bland foods, gingerale, or over the counter anti nausea medicines can help.  If you vomit more than once, notify your MD.  Anti Nausea medicines can be prescribed.          Transrectal Ultrasound   A transrectal ultrasound is an imaging test. It uses sound waves to create pictures of a mans prostate gland to determine its size.Your prostate gland is in front of your rectum and if too large may become inflamed and impede the flow of urine. For this test, a special probe (transducer) is placed directly into your rectum.     Before leaving, you may need to wait for a short time while the images are reviewed. In most cases, you can go back to your normal routine after the test.       © 8072-0607 [employer] [address] [place] [id number]. All rights reserved. This information is not intended as a substitute for professional medical care. Always follow your healthcare professional's instructions.

## 2022-09-03 ENCOUNTER — LAB VISIT (OUTPATIENT)
Dept: PRIMARY CARE CLINIC | Facility: CLINIC | Age: 57
End: 2022-09-03

## 2022-09-03 DIAGNOSIS — R33.9 URINARY RETENTION: ICD-10-CM

## 2022-09-03 PROCEDURE — U0005 INFEC AGEN DETEC AMPLI PROBE: HCPCS | Performed by: UROLOGY

## 2022-09-03 PROCEDURE — U0003 INFECTIOUS AGENT DETECTION BY NUCLEIC ACID (DNA OR RNA); SEVERE ACUTE RESPIRATORY SYNDROME CORONAVIRUS 2 (SARS-COV-2) (CORONAVIRUS DISEASE [COVID-19]), AMPLIFIED PROBE TECHNIQUE, MAKING USE OF HIGH THROUGHPUT TECHNOLOGIES AS DESCRIBED BY CMS-2020-01-R: HCPCS | Performed by: UROLOGY

## 2022-09-04 LAB — SARS-COV-2 RNA RESP QL NAA+PROBE: NOT DETECTED

## 2022-09-05 ENCOUNTER — TELEPHONE (OUTPATIENT)
Dept: FAMILY MEDICINE | Facility: CLINIC | Age: 57
End: 2022-09-05
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
## 2022-09-05 DIAGNOSIS — S30.861A TICK BITE OF ABDOMINAL WALL, INITIAL ENCOUNTER: Primary | ICD-10-CM

## 2022-09-05 RX ORDER — DOXYCYCLINE 100 MG/1
100 CAPSULE ORAL 2 TIMES DAILY
Qty: 14 CAPSULE | Refills: 0 | Status: SHIPPED | OUTPATIENT
Start: 2022-09-05 | End: 2023-09-07

## 2022-09-05 NOTE — TELEPHONE ENCOUNTER
Pt has tick bit in lower left abd wall    Tick bite of abdominal wall, initial encounter  -     doxycycline (MONODOX) 100 MG capsule; Take 1 capsule (100 mg total) by mouth 2 (two) times daily. Avoid exposure to direct sunlight on this med  Dispense: 14 capsule; Refill: 0

## 2022-09-06 ENCOUNTER — HOSPITAL ENCOUNTER (OUTPATIENT)
Facility: AMBULARY SURGERY CENTER | Age: 57
Discharge: HOME | End: 2022-09-06
Attending: UROLOGY | Admitting: UROLOGY

## 2022-09-06 DIAGNOSIS — R31.9 HEMATURIA, UNSPECIFIED TYPE: ICD-10-CM

## 2022-09-06 DIAGNOSIS — R30.0 DIFFICULT OR PAINFUL URINATION: ICD-10-CM

## 2022-09-06 DIAGNOSIS — R33.9 URINARY RETENTION: ICD-10-CM

## 2022-09-06 PROCEDURE — 52000 CYSTOURETHROSCOPY: CPT | Mod: ,,, | Performed by: UROLOGY

## 2022-09-06 PROCEDURE — 76872 PR US TRANSRECTAL: ICD-10-PCS | Mod: 26,,, | Performed by: UROLOGY

## 2022-09-06 PROCEDURE — 52000 CYSTOURETHROSCOPY: CPT | Performed by: UROLOGY

## 2022-09-06 PROCEDURE — 76872 US TRANSRECTAL: CPT | Mod: 26,,, | Performed by: UROLOGY

## 2022-09-06 PROCEDURE — 52000 PR CYSTOURETHROSCOPY: ICD-10-PCS | Mod: ,,, | Performed by: UROLOGY

## 2022-09-06 PROCEDURE — 76872 US TRANSRECTAL: CPT | Performed by: UROLOGY

## 2022-09-06 RX ORDER — WATER 1 ML/ML
IRRIGANT IRRIGATION
Status: DISCONTINUED | OUTPATIENT
Start: 2022-09-06 | End: 2022-09-06 | Stop reason: HOSPADM

## 2022-09-06 RX ORDER — LIDOCAINE HYDROCHLORIDE 20 MG/ML
JELLY TOPICAL
Status: DISCONTINUED | OUTPATIENT
Start: 2022-09-06 | End: 2022-09-06 | Stop reason: HOSPADM

## 2022-09-06 RX ORDER — TAMSULOSIN HYDROCHLORIDE 0.4 MG/1
0.4 CAPSULE ORAL DAILY
Qty: 30 CAPSULE | Refills: 11 | Status: SHIPPED | OUTPATIENT
Start: 2022-09-06 | End: 2023-09-07

## 2022-09-06 NOTE — PLAN OF CARE
Discharge instructions given to pt, verbalized understanding.  Tolerating fluids.  C/o pain 1/10.  Did not need to void.  Prescription sent to pharmacy per dr hester.  Ambulating out to self care in no distress.

## 2022-09-06 NOTE — H&P
[facility]rology New Patient/H&P:      Brandon Suazo Sr. is a 56 y.o. male who presents for evaluation of dysuria and difficulty voiding at referral of Dr Tomas     Last routine health main visit 2022 noting ED on viagra, no bothersome LUTS, nocturia x1, PSA 1.1 (from 1.2 in 2019)  Notified by Dr Tomas on  pt called in with difficult or painful urination, hematuria, and needed asap appt. Booked for today  On review he was started on flomax by Dr Tomas later that afternoon .  He did present to ER on  notin week history of dysuria and a 4 day history of fever.  He was seen at urgent care and diagnosed with UTI treated with Bactrim.  He denies any history of previous UTI or ureterolithiasis.  He does report a sensation of fullness in the perineum which he suspects represents a prostate infection.  He has no headache or neck stiffness and denies any cough shortness of breath.  He does report eye redness.  He has no history of recent diarrheal illness.  T 102.1 in ER, UA micro 50 rbc, 12 wbc. Given rocpehin and changed from bactrim to cipro. WBC 10.29 (though on review has L shift 85% granulocytes, with prior WBCs on file 3.5-4.9 historically)  Varner catheter placed with noted resistance in the urethra.  A 14 coude was eventually placed with 600 cc of urine removed.    CT negative. No stones or other concerns     He presents today noting.  22 went to [facility] for burning with urination and redness in right eye x4d. Had been gradual onset over weekend.  Possible concern for STI given R eye inflam and burning sensation. Reports he declined STI testing due to cost and asked to be empirically treated and was given rocephin and doxycyline. Advised may have UTI. He reports partner of concern had negative testing same day full panel which he viewed negative results of  - on record review, no urine checked or UA. Had trichimonas testing only, which was negative. 1g IM rocephin, and Rx for  doxycycline and tobradex ophthalmic.   Tuesday started having fever subjectively, but peaked towards end of week at 102.3 Friday, which would break with extra strength tylenol.  Wednesday 8/10/22 started seeing traces of blood in urine, and returned to [facility] urgent care concerned about continued dysuria and progressive symptoms with fever/hematuria, and not much change in urinary symptoms. - on record review UA was negative except 2+ blood. No culture sent. Advised partially treated given no wbc/nits but given symptoms 14d course of Bactrim Rxed and tobradex refilled. Advised of possibility of Shital syndrome as again deferred STI teting noting partner had neg panel x3 at [facility] urgent care earlier in week. No urine culture sent due to cost concern  Pt reports after starting bactrim, he stopped doxycycline.  As above fever progressed through week, peaking at 102.3 on Friday at home, and his fever and dysuria persisted  He contacted PCP Friday 8/12 noting pain and progressive difficulty urinating and was given norco and flomax  By Sunday 8/14 no improvement and doubled flomax to BID, and then presented to ER as above and was able to void small amount on arrival before being unable to void and monroe placed in 600cc retention as above, and DCed home despite 102 fever (covid neg). And given symptoms persistence on bactrim, changed to cipro.  Never took more than 2d doxy, 4d bactrim, and has continued cipro  Still had subj fever and chills/sweats nightsweats last night and this was after 1g tylenol. Planned to take another dose this morning but was 98.6 so didn't take tylenol. Took cipro at 8am.   Still has lingering background headache. Bladder feels more comfortable for a change. Less pressure.  Having BMs - multiple - no issues. Did take some dulcolax during this but now ok. Pain and pressure along omnroe during BM including pressure to urinate and slight leak around monroe  Was having sig SP pressure during episode  which subsided as of today  T 99.2 in clinic now  Has not taken his Flomax since the Varner catheter was placed                Past Medical History:   Diagnosis Date    Hyperlipidemia      Psoriasis                 Past Surgical History:   Procedure Laterality Date    SKIN CANCER EXCISION        TONSILLECTOMY                   Family History   Problem Relation Age of Onset    Hypertension Mother      Diabetes Mother      Macular degeneration Mother      Bell's palsy Mother      Cancer Father           liver    Alcohol abuse Father      HIV Maternal Uncle      Asthma Paternal Aunt      Cancer Maternal Grandfather           lung/ smoker         Social History               Socioeconomic History    Marital status:     Number of children: 4   Occupational History    Occupation: Construction- [employer]       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   Social History Narrative      GGBG           G- 33     G-31     B- 27     G 22      Social Determinants of Health          Financial Resource Strain: Low Risk     Difficulty of Paying Living Expenses: Not hard at all   Transportation Needs: No Transportation Needs    Lack of Transportation (Medical): No    Lack of Transportation (Non-Medical): No   Physical Activity: Sufficiently Active    Days of Exercise per Week: 5 days    Minutes of Exercise per Session: 30 min   Stress: No Stress Concern Present    Feeling of Stress : Only a little                 Review of patient's allergies indicates:   Allergen Reactions    No known drug allergies           Medications Reviewed: see  "MAR     Focused Physical Exam         Vitals:     08/16/22 0845   BP: (!) 143/90   Pulse: 87      Body mass index is 25.92 kg/m². Weight: 68.5 kg (151 lb 0.2 oz) Height: 5' 4" (162.6 cm)         Abdomen: Soft, non-tender, nondistended, no CVA tenderness  :  Normal phallus and bilat desc normal testes in normal scrotum with 14fr 30cc coude monroe in place draining clear yellow urine to large bag via leg bag extension tubing        LABS:     Recent Results         Recent Results (from the past 336 hour(s))   COVID-19 Rapid Screening     Collection Time: 08/14/22  1:10 PM   Result Value Ref Range     SARS-CoV-2 RNA, Amplification, Qual Negative Negative   Urinalysis, Reflex to Urine Culture Urine, Clean Catch     Collection Time: 08/14/22  1:10 PM     Specimen: Urine   Result Value Ref Range     Specimen UA Urine, Clean Catch       Color, UA Yellow Yellow, Straw, [place]anna     Appearance, UA Clear Clear     pH, UA 6.0 5.0 - 8.0     Specific Gravity, UA <=1.005 (A) 1.005 - 1.030     Protein, UA Trace (A) Negative     Glucose, UA Negative Negative     Ketones, UA Negative Negative     Bilirubin (UA) Negative Negative     Occult Blood UA 3+ (A) Negative     Nitrite, UA Negative Negative     Urobilinogen, UA Negative <2.0 EU/dL     Leukocytes, UA 2+ (A) Negative   Urinalysis Microscopic     Collection Time: 08/14/22  1:10 PM   Result Value Ref Range     RBC, UA 50 (H) 0 - 4 /hpf     WBC, UA 12 (H) 0 - 5 /hpf     Bacteria Occasional None-Occ /hpf     Microscopic Comment SEE COMMENT     Urine culture     Collection Time: 08/14/22  1:10 PM     Specimen: Urine   Result Value Ref Range     Urine Culture, Routine No growth     Comprehensive Metabolic Panel     Collection Time: 08/14/22  1:18 PM   Result Value Ref Range     Sodium 129 (L) 136 - 145 mmol/L     Potassium 3.9 3.5 - 5.1 mmol/L     Chloride 99 95 - 110 mmol/L     CO2 20 (L) 23 - 29 mmol/L     Glucose 126 (H) 70 - 110 mg/dL     BUN 15 6 - 20 mg/dL     Creatinine 1.3 0.5 - " 1.4 mg/dL     Calcium 9.0 8.7 - 10.5 mg/dL     Total Protein 7.0 6.0 - 8.4 g/dL     Albumin 3.4 (L) 3.5 - 5.2 g/dL     Total Bilirubin 0.4 0.1 - 1.0 mg/dL     Alkaline Phosphatase 104 55 - 135 U/L     AST 36 10 - 40 U/L     ALT 49 (H) 10 - 44 U/L     Anion Gap 10 8 - 16 mmol/L     eGFR >60 >60 mL/min/1.73 m^2   CBC Auto Differential     Collection Time: 08/14/22  1:18 PM   Result Value Ref Range     WBC 10.29 3.90 - 12.70 K/uL     RBC 4.67 4.60 - 6.20 M/uL     Hemoglobin 12.8 (L) 14.0 - 18.0 g/dL     Hematocrit 36.4 (L) 40.0 - 54.0 %     MCV 78 (L) 82 - 98 fL     MCH 27.4 27.0 - 31.0 pg     MCHC 35.2 32.0 - 36.0 g/dL     RDW 14.4 11.5 - 14.5 %     Platelets 239 150 - 450 K/uL     MPV 10.9 9.2 - 12.9 fL     Immature Granulocytes 0.4 0.0 - 0.5 %     Gran # (ANC) 8.8 (H) 1.8 - 7.7 K/uL     Immature Grans (Abs) 0.04 0.00 - 0.04 K/uL     Lymph # 0.1 (L) 1.0 - 4.8 K/uL     Mono # 1.4 (H) 0.3 - 1.0 K/uL     Eos # 0.0 0.0 - 0.5 K/uL     Baso # 0.03 0.00 - 0.20 K/uL     nRBC 0 0 /100 WBC     Gran % 85.1 (H) 38.0 - 73.0 %     Lymph % 0.9 (L) 18.0 - 48.0 %     Mono % 13.2 4.0 - 15.0 %     Eosinophil % 0.1 0.0 - 8.0 %     Basophil % 0.3 0.0 - 1.9 %     Differential Method Automated                 Assessment/Diagnosis:     1. Urinary retention  Case Request Operating Room: CYSTOSCOPY, ULTRASOUND, RECTAL APPROACH     Place in Outpatient     gentamicin injection 80 mg     cefTRIAXone injection 1 g   2. Fever, unspecified fever cause  CBC Auto Differential     Blood culture     Blood culture     gentamicin injection 80 mg     cefTRIAXone injection 1 g   3. Gross hematuria  gentamicin injection 80 mg     cefTRIAXone injection 1 g   4. Difficult or painful urination  Ambulatory referral/consult to Urology   5. Hematuria, unspecified type  Ambulatory referral/consult to Urology         Plans:  Extensively review details of emergency department visit as above including labs and imaging and urine, as well as requested and reviewed all  urgent care visit notes and treatment plans.  His initial concern given conjunctivitis and dysuria for STI has abated given negative partner testing which he has visualize, and notes that he was treated empirically.  However though he received a g of Rocephin at the urgent care, he only took 2 days of his course of doxycycline before switching to Bactrim, which has then been switched to Cipro, so he did not complete treatment, and again reiterated the points made by urgent care about undiagnosed Shital syndrome etcetera, as well as GC/CT as his significant contributory source to prostatitis, however there can also be bacterial prostatitis, and most suspicious of prostatitis versus UTI as the source of his discomfort and retention, especially if left untreated and either smaller did 1 could be get the other.  However there was no urine on initial presentation before I am and p.o. antibiotics, and days later there was only urinalysis dipstick with 2+ blood.  His urine culture on time of ER visit was negative despite white blood cells and red blood cells on urinalysis, and in the absence of urinary calculus disease, it is possible the urine culture is treated given the multiple antibiotics he was on, or possible that with some white and red cells in the urine in the setting of negative urine culture there is clinical prostatitis.  Very difficult to determine the underlying etiology given his continued changes in treatment course and incomplete testing, which is extensively reviewed with him today.  However, in the setting of acute urinary retention, with persistent fever, advised could not remove Varner catheter today pending appropriate management and treatment plan.      Any infection present would need to be cleared her at least treated appropriately for a 5-7 day course as well as alpha blockers for 5-7 days from point of acute urinary retention before attempting voiding trial.  He has not taken his Flomax since the  Varner catheter has been in place, and has only recently been on a treatment course of Cipro for 2 days.  I advised him to continue and complete the current course of Cipro, and when he is done with that to go ahead and complete the course of doxycycline that he had, as these of the 2 best antibiotics with prostatic tissue penetrance to treat prostatitis which is the best underlying etiology given his symptom complex.  Did however discuss that concerns for untreated prostatitis with significant fevers 102 for days in a row, and persistent low-grade temperature despite IV, IM, and p.o. antibiotics, has concerns for sepsis and bacteremia.  No blood cultures were drawn in the emergency department with a fever of 102, with negative COVID, and negative urine culture.     With smoldering acute prostatitis on the top of the differential at this time, will continue current course of antibiotics, and he was given another 1 g IM of Rocephin in clinic today as well as 80 mg IM gentamicin.  He did have increasing white blood cell count, with left shift at time of ER visit, and has persistent low-grade temperature of 99.2° with night sweats and chills as recently as last night despite 1 g of Tylenol use.  I will go ahead and sent him to the lab on the way out for repeat CBC as well as blood cultures x2.  I would expect at this time given the significant amount of antibiotics utilized, blood cultures would be negative but certainly if they are positive this would generate return to the hospital and admission for IV antibiotics.  As well, if he spiked a fever greater than 101 at any point he should return to the emergency department and be admitted for IV antibiotics, otherwise he seems to be clinically responding to his course of p.o. antibiotics and we did add IM antibiotics today in clinic so he should continue with this course.  As well, advised him to schedule anti-inflammatories such as ibuprofen 400-600 mg t.i.d. with meals  in the next 5-7 days to help decrease acute inflammation.     After 1 week of indwelling Varner catheter with alpha blockers, appropriate antibiotic treatment, and anti-inflammatories, assuming that his blood cultures are negative, and white blood cell count is normalizing, will return for fill and pull voiding trial.  Described this in detail to the patient today for which he will continue Flomax afterwards.  We did also discuss that in the setting of acute urinary retention, even if he is able to pass a voiding trial and avoid replacing Varner catheter, this does mandate lower tract workup for obstruction and if any obstruction is found (Ie urethral stricture, or more commonly prostatic obstruction, of which obstruction can predispose inflammation such as prostatitis) this would be indication for relief of obstruction.  I suspect obstructive underlying etiology despite previously reported minimal LUTS and normal PSA, especially given this clinical course, retention, and difficulty noted passing Varner catheter in the emergency department.     Once he has completed all Cipro and followed by the remainder of his course of doxycycline and continued Flomax, whether or not he has passed a fill and pull voiding trial next week, have planned lower tract evaluation with cystoscopy and prostate ultrasound guided volumetric measurement of prostate to help evaluate for underlying obstruction and guide further recommendations.  This was scheduled on 9/6/22 at the [facility].     All questions patient had were answered in detail, and he was agreeable to treatment plan.  Patient instructions provided below:     As well, we did also have extensive discussion about including all relevant points of his HPI in his medical record, noting that only his medical providers have access to his medical record and if anyone else, certainly relatives, were to access his medical record, it would be grounds for their dismissal, and therefore his health  "information is protected in his medical record and visible only to himself, his medical providers, and anyone whom he himself grants access to his patient portal.  Therefore despite his multiple requests I would omit any relevant portions of the above history, which was ultimately acceptable to him after this explanation of HIPAA/PHI and routine medical practice and medical record access     AVS instructions  Complete course of cipro  Then when complete, finish the doxycycline  Restart and continue flomax 0.4mg nightly  At least in next 5 days schedule advil/motrin/ibuprofen 400-600mg 3x/d with meals  If T goes above 101, return the to the ER  Monday "fill and pull" voiding trial/catheter removal  9/6/22 prostate ultrasound and cystoscope at [facility]    -->  Passed fill and pill voiding trial  Here for cystotrus  Acceptable for asc  "

## 2022-09-07 NOTE — OP NOTE
[facility] Urology Op Note/Brief DC Summary     Date: 9/6/22     Staff Surgeon: Blu Mcfarland MD     Pre-Op Diagnosis:   BPH with LUTS     Post-Op Diagnosis: BPH with LUTS     Procedure(s) Performed:   Cystoscopy, flexible  Transrectal ultrasound guided volumetric measurement of prostate     Specimen(s): none     Anesthesia: local, 2% xylocaine jelly urojet     Findings:   volume 33.2cm3 (W 44.6 mm, H 31.9 mm, L 44.6 mm), no significant PVR, no median lobe  Cystoscopy: mild concentric narrowing along anterior urethra without gross stricture, easily passable and some minor erythema/irritation along (? residual urethritis), significant kissing lateral lobe obstruction distally and more anterolaterally proximally, high grade with flow of water off. Minimal intravesical extension without median lobe. Mild trabeculations.     Estimated Blood Loss: none     Drains: none     Complications: none     Indications for procedure:  Recent retention with concern for underlying prostatitis. Started flomax, given course of doxycycline followed by cipro. Passed fill and pull voiding trial. Has been urinating well. Urethral discomfort remains 1 of 10. Presents today for lower tract evaluation for obstruction to help guide further recommendations.  - notes he didn't finish initial course of doxy before taking cipro. And has been off his flomax. Though recently found a tic and started a course of doxycycline yesterday     Procedure in detail:  After informed consent, the patient was placed in left lateral decubitus position. Transrectal ultrasound probe was passed into the rectum and the prostate was visualized on the screen.  Three-dimensional measurements taken as above with reported prostate volume as documented.  Pertinent ultrasound findings noted above, with absence of median lobe, and No ultrasonic abnormalities of prostate were visualized. Transverse and saggital image captured.  The ultrasound probe was removed     He was then  placed in supine position and prepped and drapped in standard cystoscopic fashion and 2% xylocaine jelly was instilled into the urethra.  A flexible cystoscope was passed into the bladder via the urethra.      Anterior urethra with findings above. The prostatic urethra demonstrated significant obstruction as described above in findings, with lateral lobe obstruction present with the absence of median lobe, as confirmed on retroflexion      Bladder otherwise systematically inspected and no mucosal lesions or tumors seen.  Bladder was also assessed for signs of chronic obstruction such as trabeculations, cellules, diverticulum, of which pertinent findings are noted above with extensive signs of chronic obstruction.  Bilateral ureteral orifices seen in orthotopic position on trigone bilaterally with clear efflux.       Patient tolerated the procedure well. No complications     Disposition:  Reviewed findings, natural history of prostate obstruction and mgmt strategies  Again reviewed acute urinary retention is indication for intervention  Though concern for inflammatory underlying etiology such as prostatitis  Currently voiding and emptying well, though noted not on flomax.   Given his retention and high grade visualized obstruction, advised he resume flomax daily. Compared to BPH interventions. Advised at minimum med mgmt given history and findings, as well as relation of unmanged obstruction to prostatitis as well.   Did not complete full treatment course of doxy for urethritis concerns and evidence of some today so just started course and will complete.   Will return in 4-6 months after continued flomax use for Uroflow objective assessment of obstructive voiding while on meds, and recheck luts.emptying. As long as doing well can return annually. If any concerns or worsening can discuss further intervention     Discharge:  Disposition:  Home today status post uncomplicated procedure  Diet: home  F/U: 6 mos NV  ufs/pvr/auass, 1 yr md  Instructions:  Drink plenty of water, may see blood in urine, complete doxy, continue flomax  Meds     Medication List        CONTINUE taking these medications      acetaminophen 500 MG tablet  Commonly known as: TYLENOL     doxycycline 100 MG capsule  Commonly known as: MONODOX  Take 1 capsule (100 mg total) by mouth 2 (two) times daily. Avoid exposure to direct sunlight on this med     glucosamine sulfate 500 mg Tab     multivitamin capsule     sildenafiL 50 MG tablet  Commonly known as: VIAGRA  Take 1 tablet (50 mg total) by mouth daily as needed for Erectile Dysfunction.     tamsulosin 0.4 mg Cap  Commonly known as: FLOMAX  Take 1 capsule (0.4 mg total) by mouth once daily.            ASK your doctor about these medications      HYDROcodone-acetaminophen 5-325 mg per tablet  Commonly known as: NORCO  Take 1 tablet by mouth every 8 (eight) hours as needed for Pain (pain while urination). Please take stool softeners or laxatives regularly to prevent constipation on this medication               Where to Get Your Medications        These medications were sent to [facility] [id number] [address]  [address] [address], [place] [address]      Phone: [phone]   tamsulosin 0.4 mg Cap

## 2022-09-08 VITALS
OXYGEN SATURATION: 97 % | WEIGHT: 151 LBS | DIASTOLIC BLOOD PRESSURE: 85 MMHG | SYSTOLIC BLOOD PRESSURE: 130 MMHG | HEART RATE: 70 BPM | RESPIRATION RATE: 18 BRPM | TEMPERATURE: 99 F | HEIGHT: 64 IN | BODY MASS INDEX: 25.78 KG/M2

## 2022-09-19 ENCOUNTER — TELEPHONE (OUTPATIENT)
Dept: UROLOGY | Facility: CLINIC | Age: 57
End: 2022-09-19

## 2022-09-19 NOTE — TELEPHONE ENCOUNTER
Spoke w pt he voiced that since cysto he has been having some sensation of discomfort with some slight burning but its very faint   Voiced its maybe a inch into is penis more in the urethra.  Asked was he supposed to experience this voiced emptying well declines seeing blood pt is still continuing flomax with no prob but had questions regarding discomfort in urethra.  Informed will send message to md for further recs  Encouraged hydration

## 2022-09-19 NOTE — TELEPHONE ENCOUNTER
----- Message from Oliva Spencer sent at 9/19/2022  8:58 AM CDT -----  Patient Call Back    Who Called: PT     What is the reqeust in detail: PT CALLING TO SPEAK WITH SOMEONE REGARDING SOME CONCERNS THAT HE HAVE AFTER THE PROCEDURE HE HAD. PLS ADVISE.     Can the clinic reply by [facility]?    Best Call Back Number: [phone]

## 2022-09-20 NOTE — TELEPHONE ENCOUNTER
This is not new since his procedure, this is the sensation he described prior to his procedure as well. Almost verbatim.   He had not ever taken his course of doxy and had just started that day of cystoscopy, so should hopefully improve with further time from completing course, which he just did last week  If any burning during urination can try otc azo

## 2023-02-20 ENCOUNTER — TELEPHONE (OUTPATIENT)
Dept: UROLOGY | Facility: CLINIC | Age: 58
End: 2023-02-20
Payer: COMMERCIAL

## 2023-02-20 DIAGNOSIS — Z12.5 PROSTATE CANCER SCREENING: Primary | ICD-10-CM

## 2023-02-20 NOTE — TELEPHONE ENCOUNTER
----- Message from Guerline Fayashley sent at 2/20/2023 10:38 AM CST -----  Contact: pt at [phone]  Type: Needs Medical Advice  Who Called:  pt  Best Call Back Number: [phone]  Additional Information: pt is calling the office to speak with the nurse in regards to getting his nurse visit appt to an appt with the Dr but in the afternoon if at all possible. Pt states that he can be seen anytime on Wednesday as well if that works better.  Please call back to advise.

## 2023-02-20 NOTE — TELEPHONE ENCOUNTER
Spoke with patient he states he was under the the impression he would be seeing . explained to patient he is scheduled for a nurse visit on 2/23 where he comes in with a full bladder/bladder scan/auass and based off of these results our office will call with a follow up. Patient rescheduled nurse visit to 2/22 but requesting appointment with .

## 2023-02-22 ENCOUNTER — CLINICAL SUPPORT (OUTPATIENT)
Dept: UROLOGY | Facility: CLINIC | Age: 58
End: 2023-02-22
Payer: COMMERCIAL

## 2023-02-22 DIAGNOSIS — R33.9 URINARY RETENTION: Primary | ICD-10-CM

## 2023-02-22 LAB — POC RESIDUAL URINE VOLUME: 43 ML (ref 0–100)

## 2023-02-22 PROCEDURE — 51798 US URINE CAPACITY MEASURE: CPT | Mod: S$GLB,,, | Performed by: UROLOGY

## 2023-02-22 PROCEDURE — 99499 NO LOS: ICD-10-PCS | Mod: S$GLB,,, | Performed by: UROLOGY

## 2023-02-22 PROCEDURE — 51798 POCT BLADDER SCAN: ICD-10-PCS | Mod: S$GLB,,, | Performed by: UROLOGY

## 2023-02-22 PROCEDURE — 99499 UNLISTED E&M SERVICE: CPT | Mod: S$GLB,,, | Performed by: UROLOGY

## 2023-02-22 PROCEDURE — 51741 ELECTRO-UROFLOWMETRY FIRST: CPT | Mod: S$GLB,,, | Performed by: UROLOGY

## 2023-02-22 PROCEDURE — 51741 PR UROFLOWMETRY, COMPLEX: ICD-10-PCS | Mod: S$GLB,,, | Performed by: UROLOGY

## 2023-02-22 NOTE — PROGRESS NOTES
Patient voiced would like to discontinue flomax as he is doing better.      AUASS:  Incomplete emptying- 0  Frequency- 2  Intermittency- 0  Urgency- 0  Weak Stream- 0  Straining- 0  Sleeping- 1  Total- 3  QOL-0    Uroflow results   Voiding time: 15.3s,   Flow time: 14.6s,   TTP flow: 3.7s,   Peak flowrate: 18.4 mL/s,   Average flowrate: 10.4mL/s,   Intervals: 2,   Voided volume: 152 mL,   Pvr by bladder scan: 43 ml.   Pattern of curve: to be determined by physician.

## 2023-02-26 NOTE — TELEPHONE ENCOUNTER
Can advise pt on review of nurse visit  Uroflow pattern reviewed. On flomax has good voiding curve without significant obstruction and efficient voiding time  Though Did void >150cc and peak flow 18 and avg 10 --> avg >15 is truly unobstructed so shows that meds are working and med effect is overcoming his cystoscopically seen high grade obstruction with history of retention    Will discuss continuing meds vs min invasive bph intervention.    Given cystoscopic findings of severe prostate obstruction and history of retention, he is urinating well BECAUSE of the flomax.  To stop flomax or medical management would then recommend BPH intervention.   Good candidate for Urolift/min invasive intervention given lateral lobe obstruction and good response to medical therapy.    -->  Option 1:   - book next available (public or pvt) fu to discuss BPH intervention to be able to dc meds (and continue flomax daily until then)  Option 2:   - continue flomax daily and fu in 6 mos for annual with psa prior

## 2023-02-26 NOTE — PROGRESS NOTES
Uroflow pattern reviewed. On flomax has good voiding curve without obstruction and efficient voiding time  Did void >150cc and peak flow 18 and avg 10, and avg >15 is truly unobstructed so shows med effect overcoming his cystoscopically seen high grade obstruction with history of retention  Will discuss continuing meds vs min invasive bph intervention

## 2023-02-26 NOTE — ADDENDUM NOTE
Addended by: BENIGNO JONES on: 2/26/2023 12:30 PM     Modules accepted: Orders    
[NL] : [employer]

## 2023-02-27 NOTE — TELEPHONE ENCOUNTER
Spoke w pt he was informed of MD gomez regarding recent nurse visit   Pt chose option 2   Pt scheduled in 6 mth with annual with psa   Pt agreed voicing understanding.

## 2023-03-16 ENCOUNTER — OFFICE VISIT (OUTPATIENT)
Dept: ORTHOPEDICS | Facility: CLINIC | Age: 58
End: 2023-03-16
Payer: COMMERCIAL

## 2023-03-16 VITALS
WEIGHT: 151 LBS | SYSTOLIC BLOOD PRESSURE: 126 MMHG | DIASTOLIC BLOOD PRESSURE: 78 MMHG | BODY MASS INDEX: 25.78 KG/M2 | HEIGHT: 64 IN

## 2023-03-16 DIAGNOSIS — M17.11 PRIMARY OSTEOARTHRITIS OF RIGHT KNEE: ICD-10-CM

## 2023-03-16 DIAGNOSIS — M23.306 DEGENERATIVE TEAR OF MENISCUS OF RIGHT KNEE: Primary | ICD-10-CM

## 2023-03-16 DIAGNOSIS — M17.12 PRIMARY OSTEOARTHRITIS OF LEFT KNEE: ICD-10-CM

## 2023-03-16 PROCEDURE — 3078F PR MOST RECENT DIASTOLIC BLOOD PRESSURE < 80 MM HG: ICD-10-PCS | Mod: CPTII,S$GLB,, | Performed by: ORTHOPAEDIC SURGERY

## 2023-03-16 PROCEDURE — 3008F BODY MASS INDEX DOCD: CPT | Mod: CPTII,S$GLB,, | Performed by: ORTHOPAEDIC SURGERY

## 2023-03-16 PROCEDURE — 1159F MED LIST DOCD IN RCRD: CPT | Mod: CPTII,S$GLB,, | Performed by: ORTHOPAEDIC SURGERY

## 2023-03-16 PROCEDURE — 1159F PR MEDICATION LIST DOCUMENTED IN MEDICAL RECORD: ICD-10-PCS | Mod: CPTII,S$GLB,, | Performed by: ORTHOPAEDIC SURGERY

## 2023-03-16 PROCEDURE — 3074F SYST BP LT 130 MM HG: CPT | Mod: CPTII,S$GLB,, | Performed by: ORTHOPAEDIC SURGERY

## 2023-03-16 PROCEDURE — 99203 PR OFFICE/OUTPT VISIT, NEW, LEVL III, 30-44 MIN: ICD-10-PCS | Mod: S$GLB,,, | Performed by: ORTHOPAEDIC SURGERY

## 2023-03-16 PROCEDURE — 1160F PR REVIEW ALL MEDS BY PRESCRIBER/CLIN PHARMACIST DOCUMENTED: ICD-10-PCS | Mod: CPTII,S$GLB,, | Performed by: ORTHOPAEDIC SURGERY

## 2023-03-16 PROCEDURE — 1160F RVW MEDS BY RX/DR IN RCRD: CPT | Mod: CPTII,S$GLB,, | Performed by: ORTHOPAEDIC SURGERY

## 2023-03-16 PROCEDURE — 99203 OFFICE O/P NEW LOW 30 MIN: CPT | Mod: S$GLB,,, | Performed by: ORTHOPAEDIC SURGERY

## 2023-03-16 PROCEDURE — 3078F DIAST BP <80 MM HG: CPT | Mod: CPTII,S$GLB,, | Performed by: ORTHOPAEDIC SURGERY

## 2023-03-16 PROCEDURE — 3008F PR BODY MASS INDEX (BMI) DOCUMENTED: ICD-10-PCS | Mod: CPTII,S$GLB,, | Performed by: ORTHOPAEDIC SURGERY

## 2023-03-16 PROCEDURE — 3074F PR MOST RECENT SYSTOLIC BLOOD PRESSURE < 130 MM HG: ICD-10-PCS | Mod: CPTII,S$GLB,, | Performed by: ORTHOPAEDIC SURGERY

## 2023-03-16 RX ORDER — MELOXICAM 15 MG/1
15 TABLET ORAL DAILY
Qty: 30 TABLET | Refills: 2 | Status: SHIPPED | OUTPATIENT
Start: 2023-03-16

## 2023-03-16 NOTE — PROGRESS NOTES
[facility]    Subjective:     Chief Complaint:   Chief Complaint   Patient presents with    Left Knee - Pain     Bilateral knee pain for years, states he got an injection bu Dr Sanchez a few weeks ago that didn't offer much relief. Pain increases with activity. Knees lock up intermttently    Right Knee - Pain     Bilateral knee pain for years, states he got an injection bu Dr Sanchez a few weeks ago that didn't offer much relief. He has some swelling in the back of the right knee, right knee tends to bother him more.       Past Medical History:   Diagnosis Date    Hyperlipidemia     Psoriasis        Past Surgical History:   Procedure Laterality Date    CYSTOSCOPY N/A 9/6/2022    Procedure: CYSTOSCOPY;  Surgeon: Blu Mcfarland MD;  Location: [facility] OR;  Service: Urology;  Laterality: N/A;    SKIN CANCER EXCISION      TONSILLECTOMY      TRANSRECTAL ULTRASOUND EXAMINATION N/A 9/6/2022    Procedure: ULTRASOUND, RECTAL APPROACH;  Surgeon: Blu Mcfarland MD;  Location: [facility] OR;  Service: Urology;  Laterality: N/A;       Current Outpatient Medications   Medication Sig    acetaminophen (TYLENOL) 500 MG tablet Take 500 mg by mouth every 6 (six) hours as needed for Pain.    doxycycline (MONODOX) 100 MG capsule Take 1 capsule (100 mg total) by mouth 2 (two) times daily. Avoid exposure to direct sunlight on this med    glucosamine sulfate 500 mg Tab Take 2 tablets by mouth once daily.    multivitamin capsule Take 1 capsule by mouth once daily.    sildenafiL (VIAGRA) 50 MG tablet Take 1 tablet (50 mg total) by mouth daily as needed for Erectile Dysfunction.    tamsulosin (FLOMAX) 0.4 mg Cap Take 1 capsule (0.4 mg total) by mouth once daily.    HYDROcodone-acetaminophen (NORCO) 5-325 mg per tablet Take 1 tablet by mouth every 8 (eight) hours as needed for Pain (pain while urination). Please take stool softeners or laxatives regularly to prevent constipation on this medication (Patient not taking: Reported on  8/16/2022)     No current facility-administered medications for this visit.       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   Social History Narrative     GGBG        G- 33    G-31    B- 27    G 22     Social Determinants of Health     Financial Resource Strain: Low Risk     Difficulty of Paying Living Expenses: Not hard at all   Transportation Needs: No Transportation Needs    Lack of Transportation (Medical): No    Lack of Transportation (Non-Medical): No   Physical Activity: Sufficiently Active    Days of Exercise per Week: 5 days    Minutes of Exercise per Session: 30 min   Stress: No Stress Concern Present    Feeling of Stress : Only a little       History of present illness:  Patient comes in today for the bilateral knees the right is worse than the left but both bother him.  He has had several steroid injections in these knees over the last couple years.  He actually had steroid injections 2 weeks ago.  The right knee now is locking up.  He does hike a lot.  He is also very active  contractor.      Review of Systems:    Constitution: Negative for chills, fever, and sweats.  Negative for unexplained weight loss.    HENT:  Negative for headaches and blurry vision.    Cardiovascular:Negative for chest pain or irregular heart beat. Negative for hypertension.    Respiratory:  Negative for cough and shortness of breath.    Gastrointestinal: Negative for abdominal pain, heartburn, melena, nausea, and vomitting.    Genitourinary:  Negative bladder incontinence and dysuria.    Musculoskeletal:  See HPI for details.     Neurological: Negative for numbness.    Psychiatric/Behavioral: Negative for depression.  The patient is not nervous/anxious.      Endocrine: Negative for polyuria    Hematologic/Lymphatic: Negative for bleeding problem.  Does not bruise/bleed easily.    Skin: Negative for poor would healing and rash    Objective:      Physical Examination:    Vital Signs:    Vitals:    03/16/23 0750   BP: 126/78       Body mass index is 25.92 kg/m².    This a well-developed, well nourished patient in no acute distress.  They are alert and oriented and cooperative to examination.        Patient appears to be stated age.  He has a varus deformity on the right.  Anatomic alignment on the left.  He has 1+ effusion on the right.  His knees are stable to varus valgus stresses.  He has a positive Manny's on the right for pain and a pop.  Pertinent New Results:    XRAY Report / Interpretation:   Three views of the bilateral knees demonstrate 85% loss the medial compartment on the right side.  The left side demonstrates approximately 50% loss of the medial compartment.    Assessment/Plan:      Moderate to severe osteoarthritis right knee with a degenerative meniscal tear.  Because he has failed multiple steroid injections and activity modification I have ordered an MRI of the right knee.  The question is is the medial meniscus responsible for his knee locking up.  We will see him back with that MRI      This  note was created using Dragon voice recognition software that occasionally misinterpreted phrases or words.

## 2023-04-06 ENCOUNTER — OFFICE VISIT (OUTPATIENT)
Dept: ORTHOPEDICS | Facility: CLINIC | Age: 58
End: 2023-04-06
Payer: COMMERCIAL

## 2023-04-06 VITALS
BODY MASS INDEX: 25.78 KG/M2 | SYSTOLIC BLOOD PRESSURE: 122 MMHG | HEIGHT: 64 IN | DIASTOLIC BLOOD PRESSURE: 74 MMHG | WEIGHT: 151 LBS

## 2023-04-06 DIAGNOSIS — M23.306 DEGENERATIVE TEAR OF MENISCUS OF RIGHT KNEE: ICD-10-CM

## 2023-04-06 DIAGNOSIS — M17.11 PRIMARY OSTEOARTHRITIS OF RIGHT KNEE: Primary | ICD-10-CM

## 2023-04-06 PROCEDURE — 1160F PR REVIEW ALL MEDS BY PRESCRIBER/CLIN PHARMACIST DOCUMENTED: ICD-10-PCS | Mod: CPTII,S$GLB,, | Performed by: ORTHOPAEDIC SURGERY

## 2023-04-06 PROCEDURE — 99213 PR OFFICE/OUTPT VISIT, EST, LEVL III, 20-29 MIN: ICD-10-PCS | Mod: S$GLB,,, | Performed by: ORTHOPAEDIC SURGERY

## 2023-04-06 PROCEDURE — 3074F SYST BP LT 130 MM HG: CPT | Mod: CPTII,S$GLB,, | Performed by: ORTHOPAEDIC SURGERY

## 2023-04-06 PROCEDURE — 1160F RVW MEDS BY RX/DR IN RCRD: CPT | Mod: CPTII,S$GLB,, | Performed by: ORTHOPAEDIC SURGERY

## 2023-04-06 PROCEDURE — 3008F BODY MASS INDEX DOCD: CPT | Mod: CPTII,S$GLB,, | Performed by: ORTHOPAEDIC SURGERY

## 2023-04-06 PROCEDURE — 3074F PR MOST RECENT SYSTOLIC BLOOD PRESSURE < 130 MM HG: ICD-10-PCS | Mod: CPTII,S$GLB,, | Performed by: ORTHOPAEDIC SURGERY

## 2023-04-06 PROCEDURE — 3008F PR BODY MASS INDEX (BMI) DOCUMENTED: ICD-10-PCS | Mod: CPTII,S$GLB,, | Performed by: ORTHOPAEDIC SURGERY

## 2023-04-06 PROCEDURE — 3078F PR MOST RECENT DIASTOLIC BLOOD PRESSURE < 80 MM HG: ICD-10-PCS | Mod: CPTII,S$GLB,, | Performed by: ORTHOPAEDIC SURGERY

## 2023-04-06 PROCEDURE — 1159F PR MEDICATION LIST DOCUMENTED IN MEDICAL RECORD: ICD-10-PCS | Mod: CPTII,S$GLB,, | Performed by: ORTHOPAEDIC SURGERY

## 2023-04-06 PROCEDURE — 3078F DIAST BP <80 MM HG: CPT | Mod: CPTII,S$GLB,, | Performed by: ORTHOPAEDIC SURGERY

## 2023-04-06 PROCEDURE — 99213 OFFICE O/P EST LOW 20 MIN: CPT | Mod: S$GLB,,, | Performed by: ORTHOPAEDIC SURGERY

## 2023-04-06 PROCEDURE — 1159F MED LIST DOCD IN RCRD: CPT | Mod: CPTII,S$GLB,, | Performed by: ORTHOPAEDIC SURGERY

## 2023-04-06 NOTE — PROGRESS NOTES
[facility]    Subjective:     Chief Complaint:   Chief Complaint   Patient presents with    Right Knee - Pain     Here for MRI results RT knee       Past Medical History:   Diagnosis Date    Hyperlipidemia     Psoriasis        Past Surgical History:   Procedure Laterality Date    CYSTOSCOPY N/A 9/6/2022    Procedure: CYSTOSCOPY;  Surgeon: Blu Mcfarland MD;  Location: [facility] OR;  Service: Urology;  Laterality: N/A;    SKIN CANCER EXCISION      TONSILLECTOMY      TRANSRECTAL ULTRASOUND EXAMINATION N/A 9/6/2022    Procedure: ULTRASOUND, RECTAL APPROACH;  Surgeon: Blu Mcfarland MD;  Location: [facility] OR;  Service: Urology;  Laterality: N/A;       Current Outpatient Medications   Medication Sig    acetaminophen (TYLENOL) 500 MG tablet Take 500 mg by mouth every 6 (six) hours as needed for Pain.    doxycycline (MONODOX) 100 MG capsule Take 1 capsule (100 mg total) by mouth 2 (two) times daily. Avoid exposure to direct sunlight on this med    glucosamine sulfate 500 mg Tab Take 2 tablets by mouth once daily.    meloxicam (MOBIC) 15 MG tablet Take 1 tablet (15 mg total) by mouth once daily.    multivitamin capsule Take 1 capsule by mouth once daily.    sildenafiL (VIAGRA) 50 MG tablet Take 1 tablet (50 mg total) by mouth daily as needed for Erectile Dysfunction.    tamsulosin (FLOMAX) 0.4 mg Cap Take 1 capsule (0.4 mg total) by mouth once daily.    HYDROcodone-acetaminophen (NORCO) 5-325 mg per tablet Take 1 tablet by mouth every 8 (eight) hours as needed for Pain (pain while urination). Please take stool softeners or laxatives regularly to prevent constipation on this medication (Patient not taking: Reported on 4/6/2023)     No current facility-administered medications for this visit.       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   Social History Narrative     GGBG        G- 33    G-31    B- 27    G 22     Social Determinants of Health     Financial Resource Strain: Low Risk     Difficulty of Paying Living Expenses: Not hard at all   Transportation Needs: No Transportation Needs    Lack of Transportation (Medical): No    Lack of Transportation (Non-Medical): No   Physical Activity: Sufficiently Active    Days of Exercise per Week: 5 days    Minutes of Exercise per Session: 30 min   Stress: No Stress Concern Present    Feeling of Stress : Only a little       History of present illness:  57-year-old male, returns clinic today for the right knee.  We saw him a few weeks ago for bilateral (right greater than left) knee pain.  We did an MRI of his right knee.  Here today to review those results.      Review of Systems:    Constitution: Negative for chills, fever, and sweats.  Negative for unexplained weight loss.    HENT:  Negative for headaches and blurry vision.    Cardiovascular:Negative for chest pain or irregular heart beat. Negative for hypertension.    Respiratory:  Negative for cough and shortness of breath.    Gastrointestinal: Negative for abdominal pain, heartburn, melena, nausea, and  vomitting.    Genitourinary:  Negative bladder incontinence and dysuria.    Musculoskeletal:  See HPI for details.     Neurological: Negative for numbness.    Psychiatric/Behavioral: Negative for depression.  The patient is not nervous/anxious.      Endocrine: Negative for polyuria    Hematologic/Lymphatic: Negative for bleeding problem.  Does not bruise/bleed easily.    Skin: Negative for poor would healing and rash    Objective:      Physical Examination:    Vital Signs:    Vitals:    04/06/23 0905   BP: 122/74       Body mass index is 25.92 kg/m².    This a well-developed, well nourished patient in no acute distress.  They are alert and oriented and cooperative to examination.        Examination of the right knee, range of motion 0-120 degrees.  Tender over the medial joint line.  Stable anterior-posterior varus and valgus stress.  Calf soft nontender, straight leg raise negative.    Pertinent New Results:  MRI completed at [facility] with report and images available for review.  Patient with tricompartmental arthritis moderate to severe with absence of articular cartilage noted.  Patient also with a extrusion midportion of the medial meniscus.  XRAY Report / Interpretation:       Assessment/Plan:      57-year-old male with osteoarthritis and a medial meniscal tear.  I have offered him arthroscopy of the right knee due to his age, his activity status, although ultimately he may need a total knee arthroplasty at some point, I think he will benefit significantly with a partial medial meniscectomy.  Risks and benefits of this were discussed in great detail with the patient.  He was consented for surgery.  [facility] would [facility] to [facility] at [facility].      The Patient was consented for surgery. Risks and benefits of the procedure were discussed in great detail. Complications may include but are not limited to bleeding, infection, damage to nerves, arteries, blood vessels, bones, tendons,  "ligaments, stiffness, instability, deep vein thrombus (DVT), pulmonary embolus (PE), altered sensation, continued pain, RSD, loss of motion or a need for additional surgery. As well as general anesthetic complications including seizure, stroke, heart attack and even death.    Fermín Esposito, Physician Assistant, served in the capacity as a "scribe" for this patient encounter.  A "face-to-face" encounter occurred with Dr. Harry Cannon on this date.  The treatment plan and medical decision-making is outlined above. Patient was seen and examined with a chaperone.       This note was created using Dragon voice recognition software that occasionally misinterpreted phrases or words.          "

## 2023-04-12 ENCOUNTER — TELEPHONE (OUTPATIENT)
Dept: ORTHOPEDICS | Facility: CLINIC | Age: 58
End: 2023-04-12

## 2023-04-12 DIAGNOSIS — M23.306 DEGENERATIVE TEAR OF MENISCUS OF RIGHT KNEE: Primary | ICD-10-CM

## 2023-05-01 ENCOUNTER — OFFICE VISIT (OUTPATIENT)
Dept: ORTHOPEDICS | Facility: CLINIC | Age: 58
End: 2023-05-01
Payer: COMMERCIAL

## 2023-05-01 VITALS — BODY MASS INDEX: 25.78 KG/M2 | WEIGHT: 151 LBS | HEIGHT: 64 IN

## 2023-05-01 DIAGNOSIS — Z98.890 S/P ARTHROSCOPY OF RIGHT KNEE: Primary | ICD-10-CM

## 2023-05-01 PROCEDURE — 1160F RVW MEDS BY RX/DR IN RCRD: CPT | Mod: CPTII,S$GLB,, | Performed by: PHYSICIAN ASSISTANT

## 2023-05-01 PROCEDURE — 3008F PR BODY MASS INDEX (BMI) DOCUMENTED: ICD-10-PCS | Mod: CPTII,S$GLB,, | Performed by: PHYSICIAN ASSISTANT

## 2023-05-01 PROCEDURE — 3008F BODY MASS INDEX DOCD: CPT | Mod: CPTII,S$GLB,, | Performed by: PHYSICIAN ASSISTANT

## 2023-05-01 PROCEDURE — 1159F MED LIST DOCD IN RCRD: CPT | Mod: CPTII,S$GLB,, | Performed by: PHYSICIAN ASSISTANT

## 2023-05-01 PROCEDURE — 1159F PR MEDICATION LIST DOCUMENTED IN MEDICAL RECORD: ICD-10-PCS | Mod: CPTII,S$GLB,, | Performed by: PHYSICIAN ASSISTANT

## 2023-05-01 PROCEDURE — 99024 POSTOP FOLLOW-UP VISIT: CPT | Mod: S$GLB,,, | Performed by: PHYSICIAN ASSISTANT

## 2023-05-01 PROCEDURE — 1160F PR REVIEW ALL MEDS BY PRESCRIBER/CLIN PHARMACIST DOCUMENTED: ICD-10-PCS | Mod: CPTII,S$GLB,, | Performed by: PHYSICIAN ASSISTANT

## 2023-05-01 PROCEDURE — 99024 PR POST-OP FOLLOW-UP VISIT: ICD-10-PCS | Mod: S$GLB,,, | Performed by: PHYSICIAN ASSISTANT

## 2023-05-01 NOTE — PROGRESS NOTES
[facility]  [address]  [place] [address]  Phone: [phone]   [phone]    Patient's PCP:Michael Tomas MD  Referring Provider: No ref. provider found    POST-OP Note:    Subjective:        Chief Complaint:   Chief Complaint   Patient presents with    Right Knee - Pain, Post-op Evaluation     Patient is here for a PO f/up, Right knee scope 4/21/23 & suture removal, states he is doing good, does have some pain.        Past Medical History:   Diagnosis Date    Hyperlipidemia     Psoriasis        Past Surgical History:   Procedure Laterality Date    CYSTOSCOPY N/A 9/6/2022    Procedure: CYSTOSCOPY;  Surgeon: Blu Mcfarland MD;  [facility]  Service: Urology;  Laterality: N/A;    SKIN CANCER EXCISION      TONSILLECTOMY      TRANSRECTAL ULTRASOUND EXAMINATION N/A 9/6/2022    Procedure: ULTRASOUND, RECTAL APPROACH;  Surgeon: Blu Mcfarland MD;  [facility];  Service: Urology;  Laterality: N/A;       Current Outpatient Medications   Medication Sig    acetaminophen (TYLENOL) 500 MG tablet Take 500 mg by mouth every 6 (six) hours as needed for Pain.    doxycycline (MONODOX) 100 MG capsule Take 1 capsule (100 mg total) by mouth 2 (two) times daily. Avoid exposure to direct sunlight on this med    glucosamine sulfate 500 mg Tab Take 2 tablets by mouth once daily.    meloxicam (MOBIC) 15 MG tablet Take 1 tablet (15 mg total) by mouth once daily.    multivitamin capsule Take 1 capsule by mouth once daily.    sildenafiL (VIAGRA) 50 MG tablet Take 1 tablet (50 mg total) by mouth daily as needed for Erectile Dysfunction.    tamsulosin (FLOMAX) 0.4 mg Cap Take 1 capsule (0.4 mg total) by mouth once daily.     No current facility-administered medications for this visit.       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   Social History Narrative     GGBG        G- 33    G-31    B- 27    G 22     Social Determinants of Health     Financial Resource Strain: Low Risk     Difficulty of Paying Living Expenses: Not hard at all   Transportation Needs: No Transportation Needs    Lack of Transportation (Medical): No    Lack of Transportation (Non-Medical): No   Physical Activity: Sufficiently Active    Days of Exercise per Week: 5 days    Minutes of Exercise per Session: 30 min   Stress: No Stress Concern Present    Feeling of Stress : Only a little       History of present illness: Brandon complaints today 10 days status post right knee arthroscopy.  He had a partial medial meniscectomy.  He is also found have grade 3 changes on the patella and grade 4 changes is and the entirety of the medial compartment about the medial femoral condyle and the tibia.  Lateral compartment was pristine.  Comes in today for suture removal.    Review of Systems:    Musculoskeletal:  See HPI       Objective:        Physical Examination:    Vital Signs: There were no vitals filed for this visit.    Body mass index is 25.92 kg/m².    This a well-developed, well nourished patient in no acute  distress.  They are alert and oriented and cooperative to examination.        Right knee exam: Skin to his right knee is clean dry and intact.  There is no erythema or ecchymosis.  Two arthroscopic portals are healing well without wound dehiscence or drainage.  Are no signs or symptoms of infection.  Patient is neurovascularly intact throughout the right lower extremity.  Right calf is soft and nontender.  He does have a 1+ effusion to the right knee.  Right knee range of motion 0-110 degrees.  Right knee is stable to varus and valgus stresses.  He can weightbear as tolerated on the right lower extremity.  He has a negative straight leg raise on the right.  Negative Homans sign on the right.    Pertinent New Results:     XRAY Report / Interpretation:  No new radiographs were taken on today's clinic visit.     Assessment:       1. S/P arthroscopy of right knee      Plan:     S/P arthroscopy of right knee        Follow up in about 4 weeks (around 5/29/2023) for PO Right knee scope 4/21/23, Tues/Thurs.    Sutures removed from his right knee today.  He tolerated this well.  He is advised to clean the operative sites once a day with warm soapy water and not to apply any topical cream or ointment.  He was instructed not to submerge operative site underwater in a pool or bathtub type environment for least 48 more hours.  I did explain to him he has severe arthrosis in the medial compartment in the knee and will likely need a total knee arthroplasty at some point in the future.  We will see him back in 4 weeks to see how he is progressing postoperatively.  If he still has any residual discomfort, consideration of intra-articular corticosteroid injection would likely be recommended to help seven the inflammation from his osteoarthritis.      Jaylon Jon, KIRITS, PA-C    This note was created using [employer] voice recognition software that occasionally misinterprets words or phrases.

## 2023-05-30 ENCOUNTER — OFFICE VISIT (OUTPATIENT)
Dept: ORTHOPEDICS | Facility: CLINIC | Age: 58
End: 2023-05-30
Payer: COMMERCIAL

## 2023-05-30 VITALS
HEIGHT: 64 IN | BODY MASS INDEX: 25.78 KG/M2 | DIASTOLIC BLOOD PRESSURE: 72 MMHG | WEIGHT: 151 LBS | SYSTOLIC BLOOD PRESSURE: 120 MMHG

## 2023-05-30 DIAGNOSIS — M17.11 PRIMARY OSTEOARTHRITIS OF RIGHT KNEE: ICD-10-CM

## 2023-05-30 DIAGNOSIS — Z98.890 S/P ARTHROSCOPY OF RIGHT KNEE: Primary | ICD-10-CM

## 2023-05-30 PROCEDURE — 99024 PR POST-OP FOLLOW-UP VISIT: ICD-10-PCS | Mod: S$GLB,,, | Performed by: ORTHOPAEDIC SURGERY

## 2023-05-30 PROCEDURE — 3008F PR BODY MASS INDEX (BMI) DOCUMENTED: ICD-10-PCS | Mod: CPTII,S$GLB,, | Performed by: ORTHOPAEDIC SURGERY

## 2023-05-30 PROCEDURE — 1160F RVW MEDS BY RX/DR IN RCRD: CPT | Mod: CPTII,S$GLB,, | Performed by: ORTHOPAEDIC SURGERY

## 2023-05-30 PROCEDURE — 1159F PR MEDICATION LIST DOCUMENTED IN MEDICAL RECORD: ICD-10-PCS | Mod: CPTII,S$GLB,, | Performed by: ORTHOPAEDIC SURGERY

## 2023-05-30 PROCEDURE — 3074F PR MOST RECENT SYSTOLIC BLOOD PRESSURE < 130 MM HG: ICD-10-PCS | Mod: CPTII,S$GLB,, | Performed by: ORTHOPAEDIC SURGERY

## 2023-05-30 PROCEDURE — 3008F BODY MASS INDEX DOCD: CPT | Mod: CPTII,S$GLB,, | Performed by: ORTHOPAEDIC SURGERY

## 2023-05-30 PROCEDURE — 3078F DIAST BP <80 MM HG: CPT | Mod: CPTII,S$GLB,, | Performed by: ORTHOPAEDIC SURGERY

## 2023-05-30 PROCEDURE — 99024 POSTOP FOLLOW-UP VISIT: CPT | Mod: S$GLB,,, | Performed by: ORTHOPAEDIC SURGERY

## 2023-05-30 PROCEDURE — 1159F MED LIST DOCD IN RCRD: CPT | Mod: CPTII,S$GLB,, | Performed by: ORTHOPAEDIC SURGERY

## 2023-05-30 PROCEDURE — 3074F SYST BP LT 130 MM HG: CPT | Mod: CPTII,S$GLB,, | Performed by: ORTHOPAEDIC SURGERY

## 2023-05-30 PROCEDURE — 1160F PR REVIEW ALL MEDS BY PRESCRIBER/CLIN PHARMACIST DOCUMENTED: ICD-10-PCS | Mod: CPTII,S$GLB,, | Performed by: ORTHOPAEDIC SURGERY

## 2023-05-30 PROCEDURE — 3078F PR MOST RECENT DIASTOLIC BLOOD PRESSURE < 80 MM HG: ICD-10-PCS | Mod: CPTII,S$GLB,, | Performed by: ORTHOPAEDIC SURGERY

## 2023-05-30 RX ORDER — TRIAMCINOLONE ACETONIDE 40 MG/ML
40 INJECTION, SUSPENSION INTRA-ARTICULAR; INTRAMUSCULAR
Status: DISCONTINUED | OUTPATIENT
Start: 2023-05-30 | End: 2023-05-30 | Stop reason: HOSPADM

## 2023-05-30 RX ADMIN — TRIAMCINOLONE ACETONIDE 40 MG: 40 INJECTION, SUSPENSION INTRA-ARTICULAR; INTRAMUSCULAR at 10:05

## 2023-05-30 NOTE — PROGRESS NOTES
[facility] ORTHOPEDICS    Subjective:     Chief Complaint:   Chief Complaint   Patient presents with    Right Knee - Post-op Evaluation     PO RT knee scope 4/21/23, doing ok but has pain with walking, standing, rising from sitting, stairs. Pain with getting out of car       Past Medical History:   Diagnosis Date    Hyperlipidemia     Psoriasis        Past Surgical History:   Procedure Laterality Date    CYSTOSCOPY N/A 9/6/2022    Procedure: CYSTOSCOPY;  Surgeon: Blu Mcfarland MD;  Location: [facility] OR;  Service: Urology;  Laterality: N/A;    SKIN CANCER EXCISION      TONSILLECTOMY      TRANSRECTAL ULTRASOUND EXAMINATION N/A 9/6/2022    Procedure: ULTRASOUND, RECTAL APPROACH;  Surgeon: Blu Mcfarland MD;  Location: [facility] OR;  Service: Urology;  Laterality: N/A;       Current Outpatient Medications   Medication Sig    acetaminophen (TYLENOL) 500 MG tablet Take 500 mg by mouth every 6 (six) hours as needed for Pain.    doxycycline (MONODOX) 100 MG capsule Take 1 capsule (100 mg total) by mouth 2 (two) times daily. Avoid exposure to direct sunlight on this med    glucosamine sulfate 500 mg Tab Take 2 tablets by mouth once daily.    meloxicam (MOBIC) 15 MG tablet Take 1 tablet (15 mg total) by mouth once daily.    multivitamin capsule Take 1 capsule by mouth once daily.    sildenafiL (VIAGRA) 50 MG tablet Take 1 tablet (50 mg total) by mouth daily as needed for Erectile Dysfunction.    tamsulosin (FLOMAX) 0.4 mg Cap Take 1 capsule (0.4 mg total) by mouth once daily.     No current facility-administered medications for this visit.       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   Social History Narrative     GGBG        G- 33    G-31    B- 27    G 22     Social Determinants of Health     Financial Resource Strain: Low Risk     Difficulty of Paying Living Expenses: Not hard at all   Transportation Needs: No Transportation Needs    Lack of Transportation (Medical): No    Lack of Transportation (Non-Medical): No   Physical Activity: Sufficiently Active    Days of Exercise per Week: 5 days    Minutes of Exercise per Session: 30 min   Stress: No Stress Concern Present    Feeling of Stress : Only a little       History of present illness:  Patient comes in today for the right knee.  He continues complain of mild to moderate medial sided knee pain.  Denies any trauma.  Nothing new since surgery.      Review of Systems:    Constitution: Negative for chills, fever, and sweats.  Negative for unexplained weight loss.    HENT:  Negative for headaches and blurry vision.    Cardiovascular:Negative for chest pain or irregular heart beat. Negative for hypertension.    Respiratory:  Negative for cough and shortness of breath.    Gastrointestinal: Negative for abdominal pain, heartburn, melena, nausea, and vomitting.    Genitourinary:  Negative bladder incontinence and dysuria.    Musculoskeletal:  See HPI for details.     Neurological: Negative for numbness.    Psychiatric/Behavioral: Negative for depression.  The  patient is not nervous/anxious.      Endocrine: Negative for polyuria    Hematologic/Lymphatic: Negative for bleeding problem.  Does not bruise/bleed easily.    Skin: Negative for poor would healing and rash    Objective:      Physical Examination:    Vital Signs:    Vitals:    05/30/23 1109   BP: 120/72       Body mass index is 25.92 kg/m².    This a well-developed, well nourished patient in no acute distress.  They are alert and oriented and cooperative to examination.        Patient has well-healed portals.  Range of motion 0-120 degrees.  The knee is stable to varus valgus stresses.  No crepitus today  Pertinent New Results:    XRAY Report / Interpretation:       Assessment/Plan:      Osteoarthritis right knee.  I injected the right knee with Kenalog and lidocaine.  Check him back in 6 weeks      This note was created using Dragon voice recognition software that occasionally misinterpreted phrases or words.

## 2023-05-30 NOTE — PROCEDURES
Large Joint Aspiration/Injection: L knee    Date/Time: 5/30/2023 10:45 AM  Performed by: Harry Cannon MD  Authorized by: Harry Cannon MD     Consent Done?:  Yes (Verbal)  Indications:  Pain  Site marked: the procedure site was marked    Timeout: prior to procedure the correct patient, procedure, and site was verified    Prep: patient was prepped and draped in usual sterile fashion      Local anesthesia used?: Yes    Local anesthetic:  Lidocaine 1% without epinephrine  Ultrasonic Guidance for needle placement?: No    Location:  Knee  Site:  L knee  Medications:  40 mg triamcinolone acetonide 40 mg/mL  Patient tolerance:  Patient tolerated the procedure well with no immediate complications

## 2023-06-27 ENCOUNTER — OFFICE VISIT (OUTPATIENT)
Dept: ORTHOPEDICS | Facility: CLINIC | Age: 58
End: 2023-06-27
Payer: COMMERCIAL

## 2023-06-27 DIAGNOSIS — Z98.890 S/P ARTHROSCOPY OF RIGHT KNEE: Primary | ICD-10-CM

## 2023-06-27 PROCEDURE — 99024 POSTOP FOLLOW-UP VISIT: CPT | Mod: S$GLB,POP,, | Performed by: ORTHOPAEDIC SURGERY

## 2023-06-27 PROCEDURE — 1159F PR MEDICATION LIST DOCUMENTED IN MEDICAL RECORD: ICD-10-PCS | Mod: CPTII,S$GLB,, | Performed by: ORTHOPAEDIC SURGERY

## 2023-06-27 PROCEDURE — 1160F RVW MEDS BY RX/DR IN RCRD: CPT | Mod: CPTII,S$GLB,, | Performed by: ORTHOPAEDIC SURGERY

## 2023-06-27 PROCEDURE — 1160F PR REVIEW ALL MEDS BY PRESCRIBER/CLIN PHARMACIST DOCUMENTED: ICD-10-PCS | Mod: CPTII,S$GLB,, | Performed by: ORTHOPAEDIC SURGERY

## 2023-06-27 PROCEDURE — 1159F MED LIST DOCD IN RCRD: CPT | Mod: CPTII,S$GLB,, | Performed by: ORTHOPAEDIC SURGERY

## 2023-06-27 PROCEDURE — 99024 PR POST-OP FOLLOW-UP VISIT: ICD-10-PCS | Mod: S$GLB,POP,, | Performed by: ORTHOPAEDIC SURGERY

## 2023-06-27 NOTE — PROGRESS NOTES
[facility] ORTHOPEDICS    Subjective:     Chief Complaint:   Chief Complaint   Patient presents with    Right Knee - Post-op Evaluation       Past Medical History:   Diagnosis Date    Hyperlipidemia     Psoriasis        Past Surgical History:   Procedure Laterality Date    CYSTOSCOPY N/A 9/6/2022    Procedure: CYSTOSCOPY;  Surgeon: Blu Mcfarland MD;  Location: [facility] OR;  Service: Urology;  Laterality: N/A;    SKIN CANCER EXCISION      TONSILLECTOMY      TRANSRECTAL ULTRASOUND EXAMINATION N/A 9/6/2022    Procedure: ULTRASOUND, RECTAL APPROACH;  Surgeon: Blu Mcfarland MD;  Location: [facility] OR;  Service: Urology;  Laterality: N/A;       Current Outpatient Medications   Medication Sig    acetaminophen (TYLENOL) 500 MG tablet Take 500 mg by mouth every 6 (six) hours as needed for Pain.    doxycycline (MONODOX) 100 MG capsule Take 1 capsule (100 mg total) by mouth 2 (two) times daily. Avoid exposure to direct sunlight on this med (Patient not taking: Reported on 6/27/2023)    glucosamine sulfate 500 mg Tab Take 2 tablets by mouth once daily.    meloxicam (MOBIC) 15 MG tablet Take 1 tablet (15 mg total) by mouth once daily. (Patient not taking: Reported on 6/27/2023)    multivitamin capsule Take 1 capsule by mouth once daily.    sildenafiL (VIAGRA) 50 MG tablet Take 1 tablet (50 mg total) by mouth daily as needed for Erectile Dysfunction.    tamsulosin (FLOMAX) 0.4 mg Cap Take 1 capsule (0.4 mg total) by mouth once daily. (Patient not taking: Reported on 6/27/2023)     No current facility-administered medications for this visit.       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   Social History Narrative     GGBG        G- 33    G-31    B- 27    G 22     Social Determinants of Health     Financial Resource Strain: Low Risk     Difficulty of Paying Living Expenses: Not hard at all   Transportation Needs: No Transportation Needs    Lack of Transportation (Medical): No    Lack of Transportation (Non-Medical): No   Physical Activity: Sufficiently Active    Days of Exercise per Week: 5 days    Minutes of Exercise per Session: 30 min   Stress: No Stress Concern Present    Feeling of Stress : Only a little       History of present illness:  Patient comes in today for the right knee he is generally doing very well.  Mild discomfort      Review of Systems:    Constitution: Negative for chills, fever, and sweats.  Negative for unexplained weight loss.    HENT:  Negative for headaches and blurry vision.    Cardiovascular:Negative for chest pain or irregular heart beat. Negative for hypertension.    Respiratory:  Negative for cough and shortness of breath.    Gastrointestinal: Negative for abdominal pain, heartburn, melena, nausea, and vomitting.    Genitourinary:  Negative bladder incontinence and dysuria.    Musculoskeletal:  See HPI for details.     Neurological: Negative for numbness.    Psychiatric/Behavioral: Negative for depression.  The patient is not nervous/anxious.      Endocrine: Negative for  polyuria    Hematologic/Lymphatic: Negative for bleeding problem.  Does not bruise/bleed easily.    Skin: Negative for poor would healing and rash    Objective:      Physical Examination:    Vital Signs:  There were no vitals filed for this visit.    Body mass index is 28.7 kg/m² (pended).    This a well-developed, well nourished patient in no acute distress.  They are alert and oriented and cooperative to examination.        Range of motion 0-120 degrees.  No effusion the wounds are well healed  Pertinent New Results:    XRAY Report / Interpretation:       Assessment/Plan:      Stable following right knee arthroscopy doing very well follow-up p.r.n.      This note was created using Dragon voice recognition software that occasionally misinterpreted phrases or words.

## 2023-08-18 DIAGNOSIS — N52.9 ERECTILE DYSFUNCTION, UNSPECIFIED ERECTILE DYSFUNCTION TYPE: Primary | ICD-10-CM

## 2023-08-18 RX ORDER — SILDENAFIL 50 MG/1
50 TABLET, FILM COATED ORAL DAILY PRN
Qty: 10 TABLET | Refills: 11 | Status: SHIPPED | OUTPATIENT
Start: 2023-08-18 | End: 2023-09-07 | Stop reason: SDUPTHER

## 2023-09-04 NOTE — PROGRESS NOTES
Subjective:       Patient ID: Brandon Suazo Fadumo is a 57 y.o. male.    Chief Complaint: Annual Exam and Erectile Dysfunction    Mr. Brandon Suazo is a pleasant 57-year-old  male who comes for annual checkup.      Thus far he has not been diagnosed with any major medical issues.  He does have some minor arthritic symptoms for which he takes over-the-counter supplements like glucosamine/chondroitin.    He did have a knee scopy and injection right knee by Dr. Cannon.  He has found some relief.      Prostate symptoms have continue to bother him and previously he was prescribed Flomax.  After that he took a supplement called prostatitis which seems to be helping him more and he has stop the Flomax.  His urine flow seems to be okay and no major sexual dysfunction.        He also had a lifeline screening which was negative for AAA, liver, spleen or kidney problems.  A small nodule or cystic nodule was noted in the left thyroid gland last year.      Previous labs have shown minimal dyslipidemia which did not merit treatment at this point.  His blood sugars have always have been normal.      He is nonsmoker and social alcohol use.  No illicit substance abuse.    He works as a  for buildings and projects.  He is  and has grandchildren.    He enjoys outdoors and activities like a rock climbing, hiking etc.    He drives safely.    He was updated on tetanus vaccination in 2014.  He had a Cologuard testing  October 2018 which was negative.  He will now be due for next Cologuard testing.    Mentally he seems to be happy and is in good and satisfactory relationship with his wife and family members and his colleagues and employees.    Family history also has been reviewed and updated.  (No major change)    Discussed about shingles vaccine and booster dose of COVID vaccine.    Erectile Dysfunction  This is a chronic problem. The current episode started more than 1 year ago. The problem has been gradually  improving since onset. The nature of his difficulty is penetration and maintaining erection. He reports no anxiety, decreased libido or performance anxiety. Irritative symptoms do not include frequency or urgency. Pertinent negatives include no hematuria. Past treatments include sildenafil. The treatment provided moderate relief. He has been using treatment for 2 or more years. He has had no adverse reactions caused by medications. There are no known risk factors.       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   Social History Narrative     GGBG        G- 34    G-32    B- 28    G 23     Social Determinants of Health     Financial Resource Strain: Low Risk  (6/22/2022)    Overall Financial Resource Strain (CARDIA)     Difficulty of Paying Living Expenses: Not hard at all   Food Insecurity: No Food Insecurity (6/23/2021)    Hunger Vital Sign     Worried About Running Out of Food in the Last Year: Never true     Ran Out of Food in the Last Year: Never true   Transportation Needs: No Transportation Needs (6/22/2022)    PRAPARE - Transportation     Lack of Transportation (Medical): No     Lack of Transportation (Non-Medical): No   Physical Activity: Sufficiently Active (6/22/2022)    Exercise Vital Sign     Days of Exercise per Week: 5 days     Minutes of Exercise per Session: 30 min   Stress: No Stress  Concern Present (6/22/2022)    [place] [employer] - Occupational Stress Questionnaire     Feeling of Stress : Only a little   Social Connections: Socially Integrated (6/23/2021)    Social Connection and Isolation Panel [NHANES]     Frequency of Communication with Friends and Family: Three times a week     Frequency of Social Gatherings with Friends and Family: Three times a week     Attends Oriental orthodox Services: 1 to 4 times per year     Active Member of Clubs or Organizations: Yes     Attends Club or Organization Meetings: Never     Marital Status:    Housing Stability: Low Risk  (6/23/2021)    Housing Stability Vital Sign     Unable to Pay for Housing in the Last Year: No     Number of Places Lived in the Last Year: 1     Unstable Housing in the Last Year: No     Past Surgical History:   Procedure Laterality Date    CYSTOSCOPY N/A 9/6/2022    Procedure: CYSTOSCOPY;  Surgeon: Blu Mcfarland MD;  Location: [facility] OR;  Service: Urology;  Laterality: N/A;    SKIN CANCER EXCISION      TONSILLECTOMY      TRANSRECTAL ULTRASOUND EXAMINATION N/A 9/6/2022    Procedure: ULTRASOUND, RECTAL APPROACH;  Surgeon: Blu Mcfarland MD;  Location: [facility] OR;  Service: Urology;  Laterality: N/A;     Family History   Problem Relation Age of Onset    Arthritis Mother     Hypertension Mother     Diabetes Mother     Macular degeneration Mother     Bell's palsy Mother     Cancer Father         liver    Alcohol abuse Father     Arthritis Sister         Knee problems and will have a scope.    HIV Maternal Uncle     Asthma Paternal Aunt     Cancer Maternal Grandfather         lung/ smoker       Review of Systems   Constitutional:  Negative for activity change, appetite change, diaphoresis, fatigue, fever and unexpected weight change.   HENT:  Positive for tinnitus (Persistent but no change.). Negative for congestion, hearing loss, postnasal drip, rhinorrhea and trouble swallowing.    Eyes:  Negative for pain,  "discharge and visual disturbance.   Respiratory:  Negative for apnea, cough, chest tightness and wheezing.    Cardiovascular:  Negative for chest pain, palpitations and leg swelling.   Gastrointestinal:  Negative for abdominal distention, abdominal pain, anal bleeding, blood in stool, constipation, diarrhea and vomiting.   Endocrine: Negative for cold intolerance, heat intolerance, polydipsia and polyuria.        Recently discovered to have a thyroid complex cyst on the left lobe of thyroid.   Genitourinary:  Negative for decreased libido, difficulty urinating, enuresis, frequency, hematuria and urgency.        Symptoms of erectile dysfunction.  Nocturia x1.  Good response with Viagra.  No major side effects except some nasal stuffiness.   Musculoskeletal:  Positive for arthralgias. Negative for gait problem, joint swelling and neck pain.   Allergic/Immunologic: Negative for environmental allergies.   Neurological:  Negative for dizziness, tremors, speech difficulty, weakness, light-headedness and headaches.   Psychiatric/Behavioral:  Negative for agitation, confusion, decreased concentration and dysphoric mood. The patient is not nervous/anxious.          Objective:      Blood pressure 123/71, pulse 78, height 5' 3" (1.6 m), weight 72.6 kg (160 lb). Body mass index is 28.34 kg/m².  Physical Exam  Vitals and nursing note reviewed.   Constitutional:       General: He is not in acute distress.     Appearance: Normal appearance. He is well-developed. He is not ill-appearing, toxic-appearing or diaphoretic.      Comments: BMI is 28.34   HENT:      Head: Normocephalic and atraumatic.      Right Ear: There is no impacted cerumen.      Left Ear: There is no impacted cerumen.      Nose: Nose normal.      Mouth/Throat:      Pharynx: No oropharyngeal exudate.   Eyes:      General: No scleral icterus.     Conjunctiva/sclera: Conjunctivae normal.   Neck:      Thyroid: No thyroid mass, thyromegaly or thyroid tenderness.      " Vascular: No JVD.      Trachea: No tracheal deviation.      Comments: Examination of thyroid gland does not show any evidence of mass, nodule or lesion.  Cardiovascular:      Rate and Rhythm: Normal rate and regular rhythm.      Heart sounds: Normal heart sounds. No murmur heard.     No friction rub. No gallop.   Pulmonary:      Effort: Pulmonary effort is normal. No respiratory distress.      Breath sounds: Normal breath sounds. No wheezing or rales.   Abdominal:      General: There is no distension.      Palpations: Abdomen is soft.      Tenderness: There is no abdominal tenderness.   Genitourinary:     Prostate: Not enlarged, not tender and no nodules present.      Comments: Some external hemorrhoid tags.  Prostate examination was unremarkable.  No tenderness.  Musculoskeletal:         General: No tenderness or deformity.      Cervical back: Neck supple. No rigidity.      Right lower leg: No edema.      Left lower leg: No edema.      Comments: No tenderness or deformity or discoloration noted on the right chest wall where he had a minor injury recently.   Lymphadenopathy:      Cervical: No cervical adenopathy.      Right cervical: No superficial, deep or posterior cervical adenopathy.     Left cervical: No superficial, deep or posterior cervical adenopathy.   Skin:     General: Skin is warm and dry.      Findings: No lesion or rash.   Neurological:      Mental Status: He is alert and oriented to person, place, and time.   Psychiatric:         Mood and Affect: Mood normal.         Behavior: Behavior normal.         Thought Content: Thought content normal.           Assessment:               Annual physical exam  -     Lipid Panel; Future; Expected date: 09/07/2023  -     Hemoglobin A1C; Future; Expected date: 09/07/2023  -     CBC Auto Differential; Future; Expected date: 09/07/2023    Screening for prostate cancer  -     PSA, Screening; Future; Expected date: 09/07/2023    Erectile dysfunction, unspecified  erectile dysfunction type  -     sildenafiL (VIAGRA) 25 MG tablet; Take 1 tablet (25 mg total) by mouth daily as needed for Erectile Dysfunction.  Dispense: 30 tablet; Refill: 3    Other orders  -     Cancel: HIV 1/2 Ag/Ab (4th Gen); Future; Expected date: 09/08/2023       No visits with results within 3 Month(s) from this visit.   Latest known visit with results is:   Clinical Support on 02/22/2023   Component Date Value Ref Range Status    POC Residual Urine Volume 02/22/2023 43  0 - 100 mL Corrected         Plan:           Annual physical exam  -     Lipid Panel; Future; Expected date: 09/07/2023  -     Hemoglobin A1C; Future; Expected date: 09/07/2023  -     CBC Auto Differential; Future; Expected date: 09/07/2023    Screening for prostate cancer  -     PSA, Screening; Future; Expected date: 09/07/2023    Erectile dysfunction, unspecified erectile dysfunction type  -     sildenafiL (VIAGRA) 25 MG tablet; Take 1 tablet (25 mg total) by mouth daily as needed for Erectile Dysfunction.  Dispense: 30 tablet; Refill: 3    Other orders  -     Cancel: HIV 1/2 Ag/Ab (4th Gen); Future; Expected date: 09/08/2023      Overall Brandon is doing fairly well.  Minor issues of somewhat elevated lipid panel, hemoglobin A1c and arthritis have been noted.      Prostate symptoms are fairly stable.    Erectile dysfunction symptoms is stable with Viagra at 25 mg and a new prescription has been sent.  No major side effects from this medication.  Prostate examination has been done today and it is unremarkable.      Discussed about immunizations and especially shingles vaccine and influenza vaccination.  He has not taken any COVID vaccine and he has not thus far officially had any COVID infection.    Recommended diet and incorporating more greens, vegetables, salads and fruits and lean cuts of meat including chicken or fish.    Regular walking and exercise which does not hurt his knee.  A cardio exercise or a bicycle exercise would be  good.    Weight management discussed.  He is 5 ft 3 in.  His weight is 160 lb I will throw a challenge for him to lose 5 lb of weight.    If any other changes I will see him earlier otherwise follow-up in 1 year time.  Keep follow-up with Urology and Orthopedics as needed.    He is updated on the tetanus vaccination in year 2014 and next year he will be due for a booster dose of tetanus vaccination.    He did have a negative Cologuard in 2018 and then in 2022.    Besides annual physical, a separate problem erectile dysfunction was addressed individually.  Medications were prescribed for the same with caution about side effects.      Current Outpatient Medications:     acetaminophen (TYLENOL) 500 MG tablet, Take 500 mg by mouth every 6 (six) hours as needed for Pain., Disp: , Rfl:     meloxicam (MOBIC) 15 MG tablet, Take 1 tablet (15 mg total) by mouth once daily., Disp: 30 tablet, Rfl: 2    sildenafiL (VIAGRA) 25 MG tablet, Take 1 tablet (25 mg total) by mouth daily as needed for Erectile Dysfunction., Disp: 30 tablet, Rfl: 3

## 2023-09-07 ENCOUNTER — OFFICE VISIT (OUTPATIENT)
Dept: FAMILY MEDICINE | Facility: CLINIC | Age: 58
End: 2023-09-07
Payer: COMMERCIAL

## 2023-09-07 VITALS
SYSTOLIC BLOOD PRESSURE: 123 MMHG | HEIGHT: 63 IN | DIASTOLIC BLOOD PRESSURE: 71 MMHG | BODY MASS INDEX: 28.35 KG/M2 | HEART RATE: 78 BPM | WEIGHT: 160 LBS

## 2023-09-07 DIAGNOSIS — N52.9 ERECTILE DYSFUNCTION, UNSPECIFIED ERECTILE DYSFUNCTION TYPE: ICD-10-CM

## 2023-09-07 DIAGNOSIS — Z12.5 SCREENING FOR PROSTATE CANCER: ICD-10-CM

## 2023-09-07 DIAGNOSIS — Z00.00 ANNUAL PHYSICAL EXAM: Primary | ICD-10-CM

## 2023-09-07 PROCEDURE — 3008F PR BODY MASS INDEX (BMI) DOCUMENTED: ICD-10-PCS | Mod: CPTII,S$GLB,, | Performed by: INTERNAL MEDICINE

## 2023-09-07 PROCEDURE — 3074F PR MOST RECENT SYSTOLIC BLOOD PRESSURE < 130 MM HG: ICD-10-PCS | Mod: CPTII,S$GLB,, | Performed by: INTERNAL MEDICINE

## 2023-09-07 PROCEDURE — 99396 PREV VISIT EST AGE 40-64: CPT | Mod: S$GLB,,, | Performed by: INTERNAL MEDICINE

## 2023-09-07 PROCEDURE — 3008F BODY MASS INDEX DOCD: CPT | Mod: CPTII,S$GLB,, | Performed by: INTERNAL MEDICINE

## 2023-09-07 PROCEDURE — 1160F RVW MEDS BY RX/DR IN RCRD: CPT | Mod: CPTII,S$GLB,, | Performed by: INTERNAL MEDICINE

## 2023-09-07 PROCEDURE — 99396 PR PREVENTIVE VISIT,EST,40-64: ICD-10-PCS | Mod: S$GLB,,, | Performed by: INTERNAL MEDICINE

## 2023-09-07 PROCEDURE — 1159F MED LIST DOCD IN RCRD: CPT | Mod: CPTII,S$GLB,, | Performed by: INTERNAL MEDICINE

## 2023-09-07 PROCEDURE — 3078F PR MOST RECENT DIASTOLIC BLOOD PRESSURE < 80 MM HG: ICD-10-PCS | Mod: CPTII,S$GLB,, | Performed by: INTERNAL MEDICINE

## 2023-09-07 PROCEDURE — 3078F DIAST BP <80 MM HG: CPT | Mod: CPTII,S$GLB,, | Performed by: INTERNAL MEDICINE

## 2023-09-07 PROCEDURE — 1159F PR MEDICATION LIST DOCUMENTED IN MEDICAL RECORD: ICD-10-PCS | Mod: CPTII,S$GLB,, | Performed by: INTERNAL MEDICINE

## 2023-09-07 PROCEDURE — 99212 OFFICE O/P EST SF 10 MIN: CPT | Mod: 25,S$GLB,, | Performed by: INTERNAL MEDICINE

## 2023-09-07 PROCEDURE — 99212 PR OFFICE/OUTPT VISIT, EST, LEVL II, 10-19 MIN: ICD-10-PCS | Mod: 25,S$GLB,, | Performed by: INTERNAL MEDICINE

## 2023-09-07 PROCEDURE — 3074F SYST BP LT 130 MM HG: CPT | Mod: CPTII,S$GLB,, | Performed by: INTERNAL MEDICINE

## 2023-09-07 PROCEDURE — 1160F PR REVIEW ALL MEDS BY PRESCRIBER/CLIN PHARMACIST DOCUMENTED: ICD-10-PCS | Mod: CPTII,S$GLB,, | Performed by: INTERNAL MEDICINE

## 2023-09-07 RX ORDER — SILDENAFIL 25 MG/1
25 TABLET, FILM COATED ORAL DAILY PRN
Qty: 30 TABLET | Refills: 3 | Status: SHIPPED | OUTPATIENT
Start: 2023-09-07 | End: 2024-09-06

## 2023-10-10 ENCOUNTER — LAB VISIT (OUTPATIENT)
Dept: LAB | Facility: HOSPITAL | Age: 58
End: 2023-10-10
Attending: INTERNAL MEDICINE
Payer: COMMERCIAL

## 2023-10-10 DIAGNOSIS — Z00.00 ANNUAL PHYSICAL EXAM: ICD-10-CM

## 2023-10-10 DIAGNOSIS — Z12.5 SCREENING FOR PROSTATE CANCER: ICD-10-CM

## 2023-10-10 LAB
BASOPHILS # BLD AUTO: 0.06 K/UL (ref 0–0.2)
BASOPHILS NFR BLD: 1.3 % (ref 0–1.9)
CHOLEST SERPL-MCNC: 197 MG/DL (ref 120–199)
CHOLEST/HDLC SERPL: 3.6 {RATIO} (ref 2–5)
COMPLEXED PSA SERPL-MCNC: 2 NG/ML (ref 0–4)
DIFFERENTIAL METHOD: ABNORMAL
EOSINOPHIL # BLD AUTO: 0.6 K/UL (ref 0–0.5)
EOSINOPHIL NFR BLD: 13.6 % (ref 0–8)
ERYTHROCYTE [DISTWIDTH] IN BLOOD BY AUTOMATED COUNT: 14.5 % (ref 11.5–14.5)
ESTIMATED AVG GLUCOSE: 114 MG/DL (ref 68–131)
HBA1C MFR BLD: 5.6 % (ref 4.5–6.2)
HCT VFR BLD AUTO: 46 % (ref 40–54)
HDLC SERPL-MCNC: 54 MG/DL (ref 40–75)
HDLC SERPL: 27.4 % (ref 20–50)
HGB BLD-MCNC: 15.2 G/DL (ref 14–18)
IMM GRANULOCYTES # BLD AUTO: 0.02 K/UL (ref 0–0.04)
IMM GRANULOCYTES NFR BLD AUTO: 0.4 % (ref 0–0.5)
LDLC SERPL CALC-MCNC: 119.8 MG/DL (ref 63–159)
LYMPHOCYTES # BLD AUTO: 0.2 K/UL (ref 1–4.8)
LYMPHOCYTES NFR BLD: 4.5 % (ref 18–48)
MCH RBC QN AUTO: 27.6 PG (ref 27–31)
MCHC RBC AUTO-ENTMCNC: 33 G/DL (ref 32–36)
MCV RBC AUTO: 84 FL (ref 82–98)
MONOCYTES # BLD AUTO: 0.8 K/UL (ref 0.3–1)
MONOCYTES NFR BLD: 16.2 % (ref 4–15)
NEUTROPHILS # BLD AUTO: 3 K/UL (ref 1.8–7.7)
NEUTROPHILS NFR BLD: 64 % (ref 38–73)
NONHDLC SERPL-MCNC: 143 MG/DL
NRBC BLD-RTO: 0 /100 WBC
PLATELET # BLD AUTO: 250 K/UL (ref 150–450)
PMV BLD AUTO: 10.5 FL (ref 9.2–12.9)
RBC # BLD AUTO: 5.5 M/UL (ref 4.6–6.2)
TRIGL SERPL-MCNC: 116 MG/DL (ref 30–150)
WBC # BLD AUTO: 4.63 K/UL (ref 3.9–12.7)

## 2023-10-10 PROCEDURE — 80061 LIPID PANEL: CPT | Performed by: INTERNAL MEDICINE

## 2023-10-10 PROCEDURE — 36415 COLL VENOUS BLD VENIPUNCTURE: CPT | Performed by: INTERNAL MEDICINE

## 2023-10-10 PROCEDURE — 85025 COMPLETE CBC W/AUTO DIFF WBC: CPT | Performed by: INTERNAL MEDICINE

## 2023-10-10 PROCEDURE — 83036 HEMOGLOBIN GLYCOSYLATED A1C: CPT | Performed by: INTERNAL MEDICINE

## 2023-10-10 PROCEDURE — 84153 ASSAY OF PSA TOTAL: CPT | Performed by: INTERNAL MEDICINE

## 2023-10-14 ENCOUNTER — PATIENT MESSAGE (OUTPATIENT)
Dept: FAMILY MEDICINE | Facility: CLINIC | Age: 58
End: 2023-10-14

## 2024-01-11 ENCOUNTER — TELEPHONE (OUTPATIENT)
Dept: FAMILY MEDICINE | Facility: CLINIC | Age: 59
End: 2024-01-11
Payer: COMMERCIAL

## 2024-01-11 ENCOUNTER — PATIENT MESSAGE (OUTPATIENT)
Dept: FAMILY MEDICINE | Facility: CLINIC | Age: 59
End: 2024-01-11
Payer: COMMERCIAL

## 2024-01-11 DIAGNOSIS — L40.9 PSORIASIS (A TYPE OF SKIN INFLAMMATION): Primary | ICD-10-CM

## 2024-01-11 RX ORDER — DESOXIMETASONE 0.5 MG/G
CREAM TOPICAL 2 TIMES DAILY
Qty: 60 G | Refills: 1 | Status: SHIPPED | OUTPATIENT
Start: 2024-01-11

## 2024-01-12 NOTE — TELEPHONE ENCOUNTER
Patient requests prescription for desoximetasone for erupting psoriatic lesions.  He had this in past.      Dr. Genoveva MARTÍNEZ

## 2024-05-07 ENCOUNTER — TELEPHONE (OUTPATIENT)
Dept: FAMILY MEDICINE | Facility: CLINIC | Age: 59
End: 2024-05-07
Payer: COMMERCIAL

## 2024-05-07 ENCOUNTER — PATIENT MESSAGE (OUTPATIENT)
Dept: FAMILY MEDICINE | Facility: CLINIC | Age: 59
End: 2024-05-07
Payer: COMMERCIAL

## 2024-05-07 DIAGNOSIS — N52.9 ERECTILE DYSFUNCTION, UNSPECIFIED ERECTILE DYSFUNCTION TYPE: ICD-10-CM

## 2024-05-07 RX ORDER — SILDENAFIL 25 MG/1
25 TABLET, FILM COATED ORAL DAILY PRN
Qty: 30 TABLET | Refills: 3 | Status: SHIPPED | OUTPATIENT
Start: 2024-05-07 | End: 2025-05-07

## 2024-05-08 NOTE — TELEPHONE ENCOUNTER
Patient called and left a message that he would like to have a refill on sildenafil or Viagra generic.  No side effects thus far.  [facility]  He uses good Rx.  Com coupon    Erectile dysfunction, unspecified erectile dysfunction type  -     sildenafiL (VIAGRA) 25 MG tablet; Take 1 tablet (25 mg total) by mouth daily as needed for Erectile Dysfunction.  Dispense: 30 tablet; Refill: 3

## 2024-09-07 NOTE — PROGRESS NOTES
Chief Complaint:  Annual physical    Patient Summary:  The patient is a 58-year-old gentleman who presents for an annual physical.    History of Present Illness:  The patient is a 58-year-old gentleman with a history of hyperlipidemia, psoriasis, and erectile dysfunction who presents for an annual physical. He is currently managing his hyperlipidemia through diet and regular exercise. For his psoriasis, he uses topical cream (Topicot) as needed. His erectile dysfunction is treated with sildenafil (Viagra) 25 mg as needed, with good effect and no side effects. He reports not having any new symptoms or health concerns since his last visit. He had a negative Cologuard test for colon cancer screening in , with the next test due in , and has been negatively screened for Hepatitis C in the past.    Past Medical History:  - Hyperlipidemia  - Psoriasis  - Erectile dysfunction    Past Surgical History:  - None reported    Medications:  - Sildenafil (Viagra) 25 mg as needed for erectile dysfunction  - Topical cream for psoriasis as needed    Allergies:  - No known allergies    Social History:  - Cigarette Use: Never smoked or used tobacco products  - Alcohol Use: 2-4 drinks per week, no issues with alcoholism  - Marriage:  and in a monogamous relationship 4 children  - Employment:     Family History:  - Mother: Arthritis, hypertension, diabetes, macular degeneration, Bell's palsy  - Father:  at age 73, cancer, history of alcohol abuse  - Sister: Arthritis  - Children: Four children, all doing well    Review of Systems:  - Positive for erectile dysfunction, well-treated with sildenafil, no side effects.  - Negative for all other systems.    Vitals and Physical Exam findings:  - Vitals: Blood pressure 123/71 mmHg, Pulse 78 bpm  - General Appearance: Awake, alert, oriented gentleman  - Physical Examination: Unremarkable    Assessment:  1. Annual physical  2. Erectile dysfunction:  Well-managed with sildenafil, no side effects.    Plan:  - Treatment:    - Refill sildenafil (Viagra) for one year  - Tests:    - Check lipid panel    - Check hemoglobin A1c  - Patient Education:    - Recommended tetanus (Tdap) vaccination    - Recommended shingles vaccination    - Advised to continue with COVID-19 precautions and influenza vaccination  - Follow-Up:    - Next Cologuard test for colon cancer screening due in 2025

## 2024-09-08 NOTE — PROGRESS NOTES
Subjective:       Patient ID: Brandon Suazo Sr. is a 58 y.o. male.    Chief Complaint: Annual Exam      Chief Complaint:  Annual physical    Patient Summary:  The patient is a 58-year-old gentleman who presents for an annual physical.    History of Present Illness:  The patient is a 58-year-old gentleman with a history of hyperlipidemia, psoriasis, and erectile dysfunction who presents for an annual physical. He is currently managing his hyperlipidemia through diet and regular exercise. For his psoriasis, he uses topical cream (Topicot) as needed. His erectile dysfunction is treated with sildenafil (Viagra) 25 mg as needed, with good effect and no side effects. He reports not having any new symptoms or health concerns since his last visit. He had a negative Cologuard test for colon cancer screening in , with the next test due in , and has been negatively screened for Hepatitis C in the past.    Past Medical History:  - Hyperlipidemia  - Psoriasis  - Erectile dysfunction    Past Surgical History:  - None reported    Medications:  - Sildenafil (Viagra) 25 mg as needed for erectile dysfunction  - Topical cream for psoriasis as needed    Allergies:  - No known allergies    Social History:  - Cigarette Use: Never smoked or used tobacco products  - Alcohol Use: 2-4 drinks per week, no issues with alcoholism  - Marriage:  and in a monogamous relationship 4 children  - Employment:     Family History:  - Mother: Arthritis, hypertension, diabetes, macular degeneration, Bell's palsy  - Father:  at age 73, cancer, history of alcohol abuse  - Sister: Arthritis  - Children: Four children, all doing well                Past Medical History:   Diagnosis Date    Hyperlipidemia     Psoriasis      Social History     Socioeconomic History    Marital status:     Number of children: 4   Occupational History    Occupation: Construction- [employer]     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   Social History Narrative     GGBG        G- 34    G-32    B- 28    G 23     Social Determinants of Health     Financial Resource Strain: Low Risk  (6/22/2022)    Overall Financial Resource Strain (CARDIA)     Difficulty of Paying Living Expenses: Not hard at all   Food Insecurity: No Food Insecurity (9/10/2024)    Hunger Vital Sign     Worried About Running Out of Food in the Last Year: Never true     Ran Out of Food in the Last Year: Never true   Transportation Needs: No Transportation Needs (6/22/2022)    PRAPARE - Transportation     Lack of Transportation (Medical): No     Lack of Transportation (Non-Medical): No   Physical Activity: Sufficiently Active (6/22/2022)    Exercise Vital Sign     Days of Exercise per Week: 5 days     Minutes of Exercise per Session: 30 min   Stress: No Stress Concern Present (6/22/2022)    [employer] [employer] - Occupational Stress Questionnaire     Feeling of Stress : Only a little   Housing Stability: Low Risk  (9/10/2024)    Housing Stability Vital Sign     Unable to Pay for Housing in the Last Year: No     Homeless in the Last Year: No     Past Surgical History:   Procedure Laterality Date    CYSTOSCOPY N/A 9/6/2022    Procedure: CYSTOSCOPY;  Surgeon: Blu Mcfarland MD;  Location: [facility] OR;  Service: Urology;  Laterality: N/A;    SKIN CANCER EXCISION      TONSILLECTOMY      TRANSRECTAL ULTRASOUND EXAMINATION N/A 9/6/2022    Procedure: ULTRASOUND, RECTAL APPROACH;  Surgeon: Blu Mcfarland MD;  Location: [facility] OR;  Service: Urology;  Laterality: N/A;     Family History    Problem Relation Name Age of Onset    Arthritis Mother Diana Gravesrist     Hypertension Mother Diana David     Diabetes Mother Diana Gravesrist     Macular degeneration Mother Diana Gravesrist     Bell's palsy Mother Diana David     Cancer Father Dylan Suazo         liver    Alcohol abuse Father Dlyan Suazo     Arthritis Sister Ailin         Knee problems and will have a scope.    HIV Maternal Uncle      Asthma Paternal Aunt      Cancer Maternal Grandfather          lung/ smoker       Review of Systems   Constitutional:  Negative for activity change, appetite change, diaphoresis, fatigue, fever and unexpected weight change.   HENT:  Positive for tinnitus (Persistent but no change.). Negative for congestion, hearing loss, postnasal drip, rhinorrhea and trouble swallowing.    Eyes:  Negative for pain, discharge and visual disturbance.   Respiratory:  Negative for apnea, cough, chest tightness and wheezing.    Cardiovascular:  Negative for chest pain, palpitations and leg swelling.   Gastrointestinal:  Negative for abdominal distention, abdominal pain, anal bleeding, blood in stool, constipation, diarrhea and vomiting.   Endocrine: Negative for cold intolerance, heat intolerance, polydipsia and polyuria.        Recently discovered to have a thyroid complex cyst on the left lobe of thyroid.   Genitourinary:  Negative for difficulty urinating, enuresis, frequency, hematuria and urgency.        Symptoms of erectile dysfunction.  Nocturia x1.  Good response with Viagra.  No major side effects except some nasal stuffiness.   Musculoskeletal:  Positive for arthralgias. Negative for gait problem, joint swelling and neck pain.   Allergic/Immunologic: Negative for environmental allergies.   Neurological:  Negative for dizziness, tremors, speech difficulty, weakness, light-headedness and headaches.   Psychiatric/Behavioral:  Negative for agitation, confusion, decreased concentration and dysphoric mood. The patient is not  "nervous/anxious.          Objective:      Blood pressure 132/81, pulse 79, height 5' 3" (1.6 m), weight 72.6 kg (160 lb). Body mass index is 28.34 kg/m².  Physical Exam  Vitals and nursing note reviewed.   Constitutional:       General: He is not in acute distress.     Appearance: Normal appearance. He is well-developed. He is not ill-appearing, toxic-appearing or diaphoretic.      Comments: BMI is 28.34   HENT:      Head: Normocephalic and atraumatic.      Right Ear: There is no impacted cerumen.      Left Ear: There is no impacted cerumen.      Nose: Nose normal.      Mouth/Throat:      Pharynx: No oropharyngeal exudate.   Eyes:      General: No scleral icterus.     Conjunctiva/sclera: Conjunctivae normal.   Neck:      Thyroid: No thyroid mass, thyromegaly or thyroid tenderness.      Vascular: No JVD.      Trachea: No tracheal deviation.      Comments: Examination of thyroid gland does not show any evidence of mass, nodule or lesion.  Cardiovascular:      Rate and Rhythm: Normal rate and regular rhythm.      Heart sounds: Normal heart sounds. No murmur heard.     No friction rub. No gallop.   Pulmonary:      Effort: Pulmonary effort is normal. No respiratory distress.      Breath sounds: Normal breath sounds. No wheezing or rales.   Abdominal:      General: There is no distension.      Palpations: Abdomen is soft.      Tenderness: There is no abdominal tenderness.   Genitourinary:     Prostate: Not enlarged, not tender and no nodules present.      Comments: Some external hemorrhoid tags.  Prostate examination was unremarkable.  No tenderness.  Musculoskeletal:         General: No tenderness or deformity.      Cervical back: Neck supple. No rigidity.      Right lower leg: No edema.      Left lower leg: No edema.      Comments: No tenderness or deformity or discoloration noted on the right chest wall where he had a minor injury recently.   Lymphadenopathy:      Cervical: No cervical adenopathy.      Right cervical: " No superficial, deep or posterior cervical adenopathy.     Left cervical: No superficial, deep or posterior cervical adenopathy.   Skin:     General: Skin is warm and dry.      Findings: No lesion or rash.   Neurological:      Mental Status: He is alert and oriented to person, place, and time.   Psychiatric:         Mood and Affect: Mood normal.         Behavior: Behavior normal.         Thought Content: Thought content normal.           Assessment:       No visits with results within 3 Month(s) from this visit.   Latest known visit with results is:   Lab Visit on 10/10/2023   Component Date Value Ref Range Status    Cholesterol 10/10/2023 197  120 - 199 mg/dL Final    Triglycerides 10/10/2023 116  30 - 150 mg/dL Final    HDL 10/10/2023 54  40 - 75 mg/dL Final    LDL Cholesterol 10/10/2023 119.8  63.0 - 159.0 mg/dL Final    HDL/Cholesterol Ratio 10/10/2023 27.4  20.0 - 50.0 % Final    Total Cholesterol/HDL Ratio 10/10/2023 3.6  2.0 - 5.0 Final    Non-HDL Cholesterol 10/10/2023 143  mg/dL Final    Hemoglobin A1C 10/10/2023 5.6  4.5 - 6.2 % Final    Estimated Avg Glucose 10/10/2023 114  68 - 131 mg/dL Final    WBC 10/10/2023 4.63  3.90 - 12.70 K/uL Final    RBC 10/10/2023 5.50  4.60 - 6.20 M/uL Final    Hemoglobin 10/10/2023 15.2  14.0 - 18.0 g/dL Final    Hematocrit 10/10/2023 46.0  40.0 - 54.0 % Final    MCV 10/10/2023 84  82 - 98 fL Final    MCH 10/10/2023 27.6  27.0 - 31.0 pg Final    MCHC 10/10/2023 33.0  32.0 - 36.0 g/dL Final    RDW 10/10/2023 14.5  11.5 - 14.5 % Final    Platelets 10/10/2023 250  150 - 450 K/uL Final    MPV 10/10/2023 10.5  9.2 - 12.9 fL Final    Immature Granulocytes 10/10/2023 0.4  0.0 - 0.5 % Final    Gran # (ANC) 10/10/2023 3.0  1.8 - 7.7 K/uL Final    Immature Grans (Abs) 10/10/2023 0.02  0.00 - 0.04 K/uL Final    Lymph # 10/10/2023 0.2 (L)  1.0 - 4.8 K/uL Final    Mono # 10/10/2023 0.8  0.3 - 1.0 K/uL Final    Eos # 10/10/2023 0.6 (H)  0.0 - 0.5 K/uL Final    Baso # 10/10/2023 0.06  0.00  - 0.20 K/uL Final    nRBC 10/10/2023 0  0 /100 WBC Final    Gran % 10/10/2023 64.0  38.0 - 73.0 % Final    Lymph % 10/10/2023 4.5 (L)  18.0 - 48.0 % Final    Mono % 10/10/2023 16.2 (H)  4.0 - 15.0 % Final    Eosinophil % 10/10/2023 13.6 (H)  0.0 - 8.0 % Final    Basophil % 10/10/2023 1.3  0.0 - 1.9 % Final    Differential Method 10/10/2023 Automated   Final    PSA, Screen 10/10/2023 2.00  0.00 - 4.00 ng/mL Final       1. Annual physical exam  -     Lipid Panel; Future; Expected date: 10/10/2024  -     Hemoglobin A1C; Future; Expected date: 10/10/2024  -     CBC Auto Differential; Future; Expected date: 10/10/2024  -     Comprehensive Metabolic Panel; Future; Expected date: 10/10/2024    2. Screening for human immunodeficiency virus  -     HIV 1/2 Ag/Ab (4th Gen); Future; Expected date: 10/10/2024    3. Erectile dysfunction, unspecified erectile dysfunction type    4. Psoriasis (a type of skin inflammation)    5. Screening for prostate cancer  -     PSA, SCREENING; Future; Expected date: 10/10/2024    6. Need for diphtheria-tetanus-pertussis (Tdap) vaccine  -     Tdap (BOOSTRIX) vaccine injection 0.5 mL           Component Ref Range & Units 11 mo ago 6 yr ago 7 yr ago 10 yr ago 11 yr ago 12 yr ago 13 yr ago   PSA, Screen 0.00 - 4.00 ng/mL 2.00 2.3 R 1.0 R 0.68 CM 0.66 R, CM 0.55 R, CM 0.54 R, C                Component Ref Range & Units 11 mo ago 2 yr ago 5 yr ago 10 yr ago 11 yr ago 12 yr ago 13 yr ago   Cholesterol 120 - 199 mg/dL 197 217 High  R 200 High  R 196    CM   Comment: The National Cholesterol Education Program (NCEP) has set the  following guidelines (reference ranges) for Cholesterol:  Optimal.....................<200 mg/dL  Borderline High.............200-239 mg/dL  High........................> or = 240 mg/dL   Triglycerides 30 - 150 mg/dL 116 83 R 101 R 44 CM 85   CM   Comment: The National Cholesterol Education Program (NCEP) has set the  following guidelines (reference  values) for triglycerides:  Normal......................<150 mg/dL  Borderline High.............150-199 mg/dL  High........................200-499 mg/dL   HDL 40 - 75 mg/dL 54 56 R 47 R 51 CM 46 CM 45 CM 34 Low  CM   Comment: The National Cholesterol Education Program (NCEP) has set the  following guidelines (reference values) for HDL Cholesterol:  Low...............<40 mg/dL  Optimal...........>60 mg/dL   LDL Cholesterol 63.0 - 159.0 mg/dL 119.8   136.2 .0 R, .0 R, .2 R, CM   Comment: The National Cholesterol Education Program (NCEP) has set the        Plan:   Annual physical exam  -     Lipid Panel; Future; Expected date: 10/10/2024  -     Hemoglobin A1C; Future; Expected date: 10/10/2024  -     CBC Auto Differential; Future; Expected date: 10/10/2024  -     Comprehensive Metabolic Panel; Future; Expected date: 10/10/2024    Screening for human immunodeficiency virus  -     HIV 1/2 Ag/Ab (4th Gen); Future; Expected date: 10/10/2024    Erectile dysfunction, unspecified erectile dysfunction type    Psoriasis (a type of skin inflammation)    Screening for prostate cancer  -     PSA, SCREENING; Future; Expected date: 10/10/2024    Need for diphtheria-tetanus-pertussis (Tdap) vaccine  -     Tdap (BOOSTRIX) vaccine injection 0.5 mL      Plan:  - Treatment:    -Mr. Taylor has enough sildenafil for some time and he will call if he needs refills.  - Tests:    - Check lipid panel    - Check hemoglobin A1c  - Patient Education:    - Recommended tetanus (Tdap) vaccination    - Recommended shingles vaccination    - Advised to continue with COVID-19 precautions and influenza vaccination  - Follow-Up:    - Next Cologuard test for colon cancer screening due in 2025  Follow up in about 1 year (around 9/10/2025), or if symptoms worsen or fail to improve, for Preventive Physical.      Current Outpatient Medications:     acetaminophen (TYLENOL) 500 MG tablet, Take 500 mg by mouth every 6 (six) hours as needed for  Pain., Disp: , Rfl:     clobetasoL (TEMOVATE) 0.05 % cream, SMARTSI Sparingly Topical Twice Daily PRN, Disp: , Rfl:     clotrimazole-betamethasone 1-0.05% (LOTRISONE) cream, SMARTSIG:Sparingly Topical Twice Daily, Disp: , Rfl:     desoximetasone (TOPICORT) 0.05 % cream, Apply topically 2 (two) times daily. Apply twice a day on affected areas of psoriatic patch., Disp: 60 g, Rfl: 1    hydrocortisone 2.5 % cream, Apply topically., Disp: , Rfl:     ketoconazole (NIZORAL) 2 % cream, Apply topically 2 (two) times daily., Disp: , Rfl:     meloxicam (MOBIC) 15 MG tablet, Take 1 tablet (15 mg total) by mouth once daily., Disp: 30 tablet, Rfl: 2    sildenafiL (VIAGRA) 25 MG tablet, Take 1 tablet (25 mg total) by mouth daily as needed for Erectile Dysfunction., Disp: 30 tablet, Rfl: 3    Current Facility-Administered Medications:     Tdap (BOOSTRIX) vaccine injection 0.5 mL, 0.5 mL, Intramuscular, 1 time in Clinic/HOD,     Michael Tomas

## 2024-09-10 ENCOUNTER — OFFICE VISIT (OUTPATIENT)
Dept: FAMILY MEDICINE | Facility: CLINIC | Age: 59
End: 2024-09-10
Payer: COMMERCIAL

## 2024-09-10 ENCOUNTER — PATIENT MESSAGE (OUTPATIENT)
Dept: FAMILY MEDICINE | Facility: CLINIC | Age: 59
End: 2024-09-10

## 2024-09-10 VITALS
DIASTOLIC BLOOD PRESSURE: 81 MMHG | HEIGHT: 63 IN | BODY MASS INDEX: 28.35 KG/M2 | WEIGHT: 160 LBS | HEART RATE: 79 BPM | SYSTOLIC BLOOD PRESSURE: 132 MMHG

## 2024-09-10 DIAGNOSIS — Z12.5 SCREENING FOR PROSTATE CANCER: ICD-10-CM

## 2024-09-10 DIAGNOSIS — Z23 NEED FOR DIPHTHERIA-TETANUS-PERTUSSIS (TDAP) VACCINE: ICD-10-CM

## 2024-09-10 DIAGNOSIS — Z00.00 ANNUAL PHYSICAL EXAM: Primary | ICD-10-CM

## 2024-09-10 DIAGNOSIS — L40.9 PSORIASIS (A TYPE OF SKIN INFLAMMATION): ICD-10-CM

## 2024-09-10 DIAGNOSIS — N52.9 ERECTILE DYSFUNCTION, UNSPECIFIED ERECTILE DYSFUNCTION TYPE: ICD-10-CM

## 2024-09-10 DIAGNOSIS — Z11.4 SCREENING FOR HUMAN IMMUNODEFICIENCY VIRUS: ICD-10-CM

## 2024-09-10 PROCEDURE — 3079F DIAST BP 80-89 MM HG: CPT | Mod: CPTII,S$GLB,, | Performed by: INTERNAL MEDICINE

## 2024-09-10 PROCEDURE — 99396 PREV VISIT EST AGE 40-64: CPT | Mod: 25,S$GLB,, | Performed by: INTERNAL MEDICINE

## 2024-09-10 PROCEDURE — 1159F MED LIST DOCD IN RCRD: CPT | Mod: CPTII,S$GLB,, | Performed by: INTERNAL MEDICINE

## 2024-09-10 PROCEDURE — 1160F RVW MEDS BY RX/DR IN RCRD: CPT | Mod: CPTII,S$GLB,, | Performed by: INTERNAL MEDICINE

## 2024-09-10 PROCEDURE — 3008F BODY MASS INDEX DOCD: CPT | Mod: CPTII,S$GLB,, | Performed by: INTERNAL MEDICINE

## 2024-09-10 PROCEDURE — 90715 TDAP VACCINE 7 YRS/> IM: CPT | Mod: S$GLB,,, | Performed by: INTERNAL MEDICINE

## 2024-09-10 PROCEDURE — 90471 IMMUNIZATION ADMIN: CPT | Mod: S$GLB,,, | Performed by: INTERNAL MEDICINE

## 2024-09-10 PROCEDURE — 99999 PR PBB SHADOW E&M-EST. PATIENT-LVL III: CPT | Mod: PBBFAC,,, | Performed by: INTERNAL MEDICINE

## 2024-09-10 PROCEDURE — 3075F SYST BP GE 130 - 139MM HG: CPT | Mod: CPTII,S$GLB,, | Performed by: INTERNAL MEDICINE

## 2024-09-10 RX ORDER — CLOTRIMAZOLE AND BETAMETHASONE DIPROPIONATE 10; .64 MG/G; MG/G
CREAM TOPICAL
COMMUNITY
Start: 2024-07-29

## 2024-09-10 RX ORDER — HYDROCORTISONE 25 MG/G
CREAM TOPICAL
COMMUNITY
Start: 2024-07-09

## 2024-09-10 RX ORDER — KETOCONAZOLE 20 MG/G
CREAM TOPICAL 2 TIMES DAILY
COMMUNITY
Start: 2024-07-09

## 2024-09-10 RX ORDER — CLOBETASOL PROPIONATE 0.5 MG/G
CREAM TOPICAL
COMMUNITY
Start: 2024-05-08

## 2024-10-17 ENCOUNTER — LAB VISIT (OUTPATIENT)
Dept: LAB | Facility: HOSPITAL | Age: 59
End: 2024-10-17
Attending: INTERNAL MEDICINE
Payer: COMMERCIAL

## 2024-10-17 DIAGNOSIS — Z00.00 ANNUAL PHYSICAL EXAM: ICD-10-CM

## 2024-10-17 DIAGNOSIS — Z11.4 SCREENING FOR HUMAN IMMUNODEFICIENCY VIRUS: ICD-10-CM

## 2024-10-17 DIAGNOSIS — Z12.5 SCREENING FOR PROSTATE CANCER: ICD-10-CM

## 2024-10-17 LAB
ALBUMIN SERPL BCP-MCNC: 4.6 G/DL (ref 3.5–5.2)
ALP SERPL-CCNC: 99 U/L (ref 55–135)
ALT SERPL W/O P-5'-P-CCNC: 17 U/L (ref 10–44)
ANION GAP SERPL CALC-SCNC: 5 MMOL/L (ref 8–16)
AST SERPL-CCNC: 16 U/L (ref 10–40)
BASOPHILS # BLD AUTO: 0.05 K/UL (ref 0–0.2)
BASOPHILS NFR BLD: 1 % (ref 0–1.9)
BILIRUB SERPL-MCNC: 0.6 MG/DL (ref 0.1–1)
BUN SERPL-MCNC: 20 MG/DL (ref 6–20)
CALCIUM SERPL-MCNC: 9.5 MG/DL (ref 8.7–10.5)
CHLORIDE SERPL-SCNC: 105 MMOL/L (ref 95–110)
CHOLEST SERPL-MCNC: 186 MG/DL (ref 120–199)
CHOLEST/HDLC SERPL: 4.1 {RATIO} (ref 2–5)
CO2 SERPL-SCNC: 28 MMOL/L (ref 23–29)
COMPLEXED PSA SERPL-MCNC: 1.34 NG/ML (ref 0–4)
CREAT SERPL-MCNC: 1.1 MG/DL (ref 0.5–1.4)
DIFFERENTIAL METHOD BLD: ABNORMAL
EOSINOPHIL # BLD AUTO: 0.7 K/UL (ref 0–0.5)
EOSINOPHIL NFR BLD: 14.6 % (ref 0–8)
ERYTHROCYTE [DISTWIDTH] IN BLOOD BY AUTOMATED COUNT: 15.1 % (ref 11.5–14.5)
EST. GFR  (NO RACE VARIABLE): >60 ML/MIN/1.73 M^2
ESTIMATED AVG GLUCOSE: 123 MG/DL (ref 68–131)
GLUCOSE SERPL-MCNC: 103 MG/DL (ref 70–110)
HBA1C MFR BLD: 5.9 % (ref 4.5–6.2)
HCT VFR BLD AUTO: 46.8 % (ref 40–54)
HDLC SERPL-MCNC: 45 MG/DL (ref 40–75)
HDLC SERPL: 24.2 % (ref 20–50)
HGB BLD-MCNC: 15 G/DL (ref 14–18)
IMM GRANULOCYTES # BLD AUTO: 0.02 K/UL (ref 0–0.04)
IMM GRANULOCYTES NFR BLD AUTO: 0.4 % (ref 0–0.5)
LDLC SERPL CALC-MCNC: 121.4 MG/DL (ref 63–159)
LYMPHOCYTES # BLD AUTO: 0.2 K/UL (ref 1–4.8)
LYMPHOCYTES NFR BLD: 4.6 % (ref 18–48)
MCH RBC QN AUTO: 26.3 PG (ref 27–31)
MCHC RBC AUTO-ENTMCNC: 32.1 G/DL (ref 32–36)
MCV RBC AUTO: 82 FL (ref 82–98)
MONOCYTES # BLD AUTO: 0.7 K/UL (ref 0.3–1)
MONOCYTES NFR BLD: 13.8 % (ref 4–15)
NEUTROPHILS # BLD AUTO: 3.2 K/UL (ref 1.8–7.7)
NEUTROPHILS NFR BLD: 65.6 % (ref 38–73)
NONHDLC SERPL-MCNC: 141 MG/DL
NRBC BLD-RTO: 0 /100 WBC
PLATELET # BLD AUTO: 267 K/UL (ref 150–450)
PMV BLD AUTO: 10.9 FL (ref 9.2–12.9)
POTASSIUM SERPL-SCNC: 4.3 MMOL/L (ref 3.5–5.1)
PROT SERPL-MCNC: 7.1 G/DL (ref 6–8.4)
RBC # BLD AUTO: 5.71 M/UL (ref 4.6–6.2)
SODIUM SERPL-SCNC: 138 MMOL/L (ref 136–145)
TRIGL SERPL-MCNC: 98 MG/DL (ref 30–150)
WBC # BLD AUTO: 4.8 K/UL (ref 3.9–12.7)

## 2024-10-17 PROCEDURE — 85025 COMPLETE CBC W/AUTO DIFF WBC: CPT | Performed by: INTERNAL MEDICINE

## 2024-10-17 PROCEDURE — 36415 COLL VENOUS BLD VENIPUNCTURE: CPT | Performed by: INTERNAL MEDICINE

## 2024-10-17 PROCEDURE — 80053 COMPREHEN METABOLIC PANEL: CPT | Performed by: INTERNAL MEDICINE

## 2024-10-17 PROCEDURE — 84153 ASSAY OF PSA TOTAL: CPT | Performed by: INTERNAL MEDICINE

## 2024-10-17 PROCEDURE — 83036 HEMOGLOBIN GLYCOSYLATED A1C: CPT | Performed by: INTERNAL MEDICINE

## 2024-10-17 PROCEDURE — 87389 HIV-1 AG W/HIV-1&-2 AB AG IA: CPT | Performed by: INTERNAL MEDICINE

## 2024-10-17 PROCEDURE — 80061 LIPID PANEL: CPT | Performed by: INTERNAL MEDICINE

## 2024-10-18 LAB — HIV 1+2 AB+HIV1 P24 AG SERPL QL IA: NON REACTIVE

## 2024-10-21 ENCOUNTER — TELEPHONE (OUTPATIENT)
Dept: FAMILY MEDICINE | Facility: CLINIC | Age: 59
End: 2024-10-21
Payer: COMMERCIAL

## 2024-10-21 NOTE — TELEPHONE ENCOUNTER
----- Message from Michael Tomas MD sent at 10/17/2024 11:38 PM CDT -----  The results are within acceptable range.  Please keep regular follow up.

## 2025-03-03 ENCOUNTER — OFFICE VISIT (OUTPATIENT)
Dept: FAMILY MEDICINE | Facility: CLINIC | Age: 60
End: 2025-03-03
Payer: COMMERCIAL

## 2025-03-03 ENCOUNTER — PATIENT MESSAGE (OUTPATIENT)
Dept: FAMILY MEDICINE | Facility: CLINIC | Age: 60
End: 2025-03-03

## 2025-03-03 ENCOUNTER — RESULTS FOLLOW-UP (OUTPATIENT)
Dept: FAMILY MEDICINE | Facility: CLINIC | Age: 60
End: 2025-03-03

## 2025-03-03 ENCOUNTER — LAB VISIT (OUTPATIENT)
Dept: LAB | Facility: HOSPITAL | Age: 60
End: 2025-03-03
Attending: INTERNAL MEDICINE

## 2025-03-03 VITALS
HEART RATE: 65 BPM | HEIGHT: 63 IN | SYSTOLIC BLOOD PRESSURE: 131 MMHG | DIASTOLIC BLOOD PRESSURE: 80 MMHG | WEIGHT: 162 LBS | BODY MASS INDEX: 28.7 KG/M2

## 2025-03-03 DIAGNOSIS — R21 RASH: ICD-10-CM

## 2025-03-03 DIAGNOSIS — D72.19 OTHER EOSINOPHILIA: Chronic | ICD-10-CM

## 2025-03-03 DIAGNOSIS — R21 RASH: Primary | ICD-10-CM

## 2025-03-03 DIAGNOSIS — L29.9 ITCHING: ICD-10-CM

## 2025-03-03 LAB
ALBUMIN SERPL BCP-MCNC: 4.2 G/DL (ref 3.5–5.2)
ALP SERPL-CCNC: 95 U/L (ref 55–135)
ALT SERPL W/O P-5'-P-CCNC: 27 U/L (ref 10–44)
ANION GAP SERPL CALC-SCNC: 2 MMOL/L (ref 8–16)
AST SERPL-CCNC: 17 U/L (ref 10–40)
BASOPHILS # BLD AUTO: 0.06 K/UL (ref 0–0.2)
BASOPHILS NFR BLD: 1.1 % (ref 0–1.9)
BILIRUB SERPL-MCNC: 0.3 MG/DL (ref 0.1–1)
BUN SERPL-MCNC: 19 MG/DL (ref 6–20)
CALCIUM SERPL-MCNC: 9.6 MG/DL (ref 8.7–10.5)
CHLORIDE SERPL-SCNC: 105 MMOL/L (ref 95–110)
CO2 SERPL-SCNC: 30 MMOL/L (ref 23–29)
CREAT SERPL-MCNC: 1 MG/DL (ref 0.5–1.4)
CRP SERPL-MCNC: 0.8 MG/DL
DIFFERENTIAL METHOD BLD: ABNORMAL
EOSINOPHIL # BLD AUTO: 0.9 K/UL (ref 0–0.5)
EOSINOPHIL NFR BLD: 15.6 % (ref 0–8)
ERYTHROCYTE [DISTWIDTH] IN BLOOD BY AUTOMATED COUNT: 14.3 % (ref 11.5–14.5)
ERYTHROCYTE [SEDIMENTATION RATE] IN BLOOD BY WESTERGREN METHOD: 3 MM/HR (ref 0–10)
EST. GFR  (NO RACE VARIABLE): >60 ML/MIN/1.73 M^2
GLUCOSE SERPL-MCNC: 114 MG/DL (ref 70–110)
HCT VFR BLD AUTO: 44.3 % (ref 40–54)
HGB BLD-MCNC: 13.8 G/DL (ref 14–18)
IMM GRANULOCYTES # BLD AUTO: 0.02 K/UL (ref 0–0.04)
IMM GRANULOCYTES NFR BLD AUTO: 0.4 % (ref 0–0.5)
LYMPHOCYTES # BLD AUTO: 0.2 K/UL (ref 1–4.8)
LYMPHOCYTES NFR BLD: 3.9 % (ref 18–48)
MCH RBC QN AUTO: 26.3 PG (ref 27–31)
MCHC RBC AUTO-ENTMCNC: 31.2 G/DL (ref 32–36)
MCV RBC AUTO: 85 FL (ref 82–98)
MONOCYTES # BLD AUTO: 0.7 K/UL (ref 0.3–1)
MONOCYTES NFR BLD: 12.6 % (ref 4–15)
NEUTROPHILS # BLD AUTO: 3.7 K/UL (ref 1.8–7.7)
NEUTROPHILS NFR BLD: 66.4 % (ref 38–73)
NRBC BLD-RTO: 0 /100 WBC
PLATELET # BLD AUTO: 296 K/UL (ref 150–450)
PMV BLD AUTO: 10.8 FL (ref 9.2–12.9)
POTASSIUM SERPL-SCNC: 4.6 MMOL/L (ref 3.5–5.1)
PROT SERPL-MCNC: 6.7 G/DL (ref 6–8.4)
RBC # BLD AUTO: 5.24 M/UL (ref 4.6–6.2)
SODIUM SERPL-SCNC: 137 MMOL/L (ref 136–145)
WBC # BLD AUTO: 5.57 K/UL (ref 3.9–12.7)

## 2025-03-03 PROCEDURE — 86140 C-REACTIVE PROTEIN: CPT | Performed by: INTERNAL MEDICINE

## 2025-03-03 PROCEDURE — 36415 COLL VENOUS BLD VENIPUNCTURE: CPT | Performed by: INTERNAL MEDICINE

## 2025-03-03 PROCEDURE — 80053 COMPREHEN METABOLIC PANEL: CPT | Performed by: INTERNAL MEDICINE

## 2025-03-03 PROCEDURE — 85025 COMPLETE CBC W/AUTO DIFF WBC: CPT | Performed by: INTERNAL MEDICINE

## 2025-03-03 PROCEDURE — 85651 RBC SED RATE NONAUTOMATED: CPT | Performed by: INTERNAL MEDICINE

## 2025-03-03 PROCEDURE — 99999 PR PBB SHADOW E&M-EST. PATIENT-LVL III: CPT | Mod: PBBFAC,,, | Performed by: INTERNAL MEDICINE

## 2025-03-03 RX ORDER — HYDROXYZINE HYDROCHLORIDE 10 MG/1
10 TABLET, FILM COATED ORAL 3 TIMES DAILY PRN
Qty: 30 TABLET | Refills: 0 | Status: SHIPPED | OUTPATIENT
Start: 2025-03-03 | End: 2025-03-13

## 2025-03-03 RX ORDER — FAMOTIDINE 20 MG/1
20 TABLET, FILM COATED ORAL 2 TIMES DAILY
Qty: 20 TABLET | Refills: 0 | Status: SHIPPED | OUTPATIENT
Start: 2025-03-03 | End: 2025-03-13

## 2025-03-03 NOTE — PROGRESS NOTES
Subjective:       Patient ID: Brandon Suazo Sr. is a 59 y.o. male.    Chief Complaint: Rash (All over itchy)    History of Present Illness    CHIEF COMPLAINT:  Brandon presents with a widespread rash affecting multiple areas of the body, accompanied by itching.  For about 2 weeks before his trip to [place].  It does not recall any exposure, taking any new medication    HPI:  Brandon reports a history of recurrent rash on his shoulders and neckline for the past 2-3 years, previously treated with steroid cream prescribed by a dermatologist. The cream would resolve the rash within a few days, with recurrence after weeks. Recently, a new rash has appeared on his feet, legs, thighs, back, and upper chest. This new rash differs from the previous one and does not respond to the steroid cream. The rash is itchy, interferes with sleep, and blanches when pressed. He had the rash before traveling to [place] on Saturday, specifically to the [place] area in [facility]. The rash is not improving over time.    Brandon has a history of psoriasis, which previously affected limited areas and has recently started to affect his hands more than before. He has been evaluated by several dermatologists in the past, including Dr. Ramsay, Dr. Boo, and Dr. Jeff (a Mohs surgeon who removed a skin cancer). Dr. Menchaca, described as a well-known dermatologist, has also seen him and prescribed more treatment. He reports no clear trigger for the current rash, such as food or medication, and mentions that changing detergents did not affect the condition.    Brandon denies fever, cough, mucus, recent measles exposure, and any travel outside the country.    MEDICATIONS:  Brandon is on a steroid cream, applied topically to the rash on his shoulders and neckline as needed.    MEDICAL HISTORY:  Brandon has a history of psoriasis and skin cancer. Brandon received the measles vaccine during his childhood.    SURGICAL HISTORY:  He underwent skin cancer removal performed  "by Dr. Jeff, a [facility] surgeon. The date of the procedure was not specified.    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      ROS:  General: -fever  Respiratory: -cough  Skin: +rash  Psychiatric: +sleep difficulty         Past Medical History:   Diagnosis Date    Hyperlipidemia     Psoriasis      Social History[1]  Past Surgical History:   Procedure Laterality Date    CYSTOSCOPY N/A 9/6/2022    Procedure: CYSTOSCOPY;  Surgeon: Blu Mcfarland MD;  Location: [facility] OR;  Service: Urology;  Laterality: N/A;    SKIN CANCER EXCISION      TONSILLECTOMY      TRANSRECTAL ULTRASOUND EXAMINATION N/A 9/6/2022    Procedure: ULTRASOUND, RECTAL APPROACH;  Surgeon: Blu Mcfarland MD;  Location: [facility] OR;  Service: Urology;  Laterality: N/A;     Family History   Problem Relation Name Age of Onset    Arthritis Mother Diana Gravesrist     Hypertension Mother Diana Gravesrist     Diabetes Mother Diana Garvesrist     Macular degeneration Mother Diana Gravesrist     Bell's palsy Mother Diana Gravesrist     Cancer Father Dylan Suazo         liver    Alcohol abuse Father Dylan Suazo     Arthritis Sister Ailin         Knee problems and will have a scope.    HIV Maternal Uncle      Asthma Paternal Aunt      Cancer Maternal Grandfather          lung/ smoker       Objective:      Physical Exam  Blood pressure 131/80, pulse 65, height 5' 3" (1.6 m), weight 73.5 kg (162 lb). Body mass index is 28.7 kg/m².  Physical Exam    General: No acute distress. Well-developed. Well-nourished.  Nontoxic  Eyes: EOMI. Sclerae anicteric.  HENT: Normocephalic. Atraumatic. Nares patent. Moist oral mucosa.    Cardiovascular: Regular rate. Regular rhythm. No murmurs. No rubs. No gallops. Normal S1, S2.  Respiratory: Normal respiratory effort. Clear to auscultation bilaterally. No rales. No rhonchi. No wheezing.  Abdomen: Soft. Non-tender. Non-distended. Normoactive bowel sounds.  Musculoskeletal: " No  obvious deformity.  Extremities: No lower extremity edema.  Neurological: Alert & oriented x3. No slurred speech. Normal gait.  Psychiatric: Normal mood. Normal affect. Good insight. Good judgment.  Skin: Warm. Dry. Blanching rash on feet, legs, thighs, back, and upper chest.                                Assessment:         Component      Latest Ref Rn 3/3/2025   WBC      3.90 - 12.70 K/uL 5.57    RBC      4.60 - 6.20 M/uL 5.24    Hemoglobin      14.0 - 18.0 g/dL 13.8 (L)    Hematocrit      40.0 - 54.0 % 44.3    MCV      82 - 98 fL 85    MCH      27.0 - 31.0 pg 26.3 (L)    MCHC      32.0 - 36.0 g/dL 31.2 (L)    RDW      11.5 - 14.5 % 14.3    Platelet Count      150 - 450 K/uL 296    MPV      9.2 - 12.9 fL 10.8    Immature Granulocytes      0.0 - 0.5 % 0.4    Gran # (ANC)      1.8 - 7.7 K/uL 3.7    Immature Grans (Abs)      0.00 - 0.04 K/uL 0.02    Lymph #      1.0 - 4.8 K/uL 0.2 (L)    Mono #      0.3 - 1.0 K/uL 0.7    Eos #      0.0 - 0.5 K/uL 0.9 (H)    Baso #      0.00 - 0.20 K/uL 0.06    nRBC      0 /100 WBC 0    Gran %      38.0 - 73.0 % 66.4    Lymph %      18.0 - 48.0 % 3.9 (L)    Mono %      4.0 - 15.0 % 12.6    Eos %      0.0 - 8.0 % 15.6 (H)    Basophil %      0.0 - 1.9 % 1.1    Differential Method Automated    Sodium      136 - 145 mmol/L 137    Potassium      3.5 - 5.1 mmol/L 4.6    Chloride      95 - 110 mmol/L 105    CO2      23 - 29 mmol/L 30 (H)    Glucose      70 - 110 mg/dL 114 (H)    BUN      6 - 20 mg/dL 19    Creatinine      0.5 - 1.4 mg/dL 1.0    Calcium      8.7 - 10.5 mg/dL 9.6    PROTEIN TOTAL      6.0 - 8.4 g/dL 6.7    Albumin      3.5 - 5.2 g/dL 4.2    BILIRUBIN TOTAL      0.1 - 1.0 mg/dL 0.3    ALP      55 - 135 U/L 95    AST      10 - 40 U/L 17    ALT      10 - 44 U/L 27    eGFR      >60 mL/min/1.73 m^2 >60.0    Anion Gap      8 - 16 mmol/L 2 (L)    CRP      <1.00 mg/dL 0.80    Sed Rate      0 - 10 mm/Hr 3       Lymph % 3.9 Low  4.6 Low  4.5 Low  0.9 Low  0.9 Low  16.6 Low  17.5  Low  R   Mono % 12.6 13.8 16.2 High  10.8 13.2 14.9 13.5 High  R   Eosinophil % 15.6 High  14.6 High  13.6 High  0.9 0.1 3.7 4.0 R     Legend:  (L) Low  (H) High    Assessment & Plan    IMPRESSION:  - Evaluated patient with diffuse morbilliform rash on abdomen, back, legs, and thigh  - Considered differential diagnoses including viral infection, drug reaction, autoimmune condition, and idiopathic causes  - Ruled out measles due to patient's vaccination history  - Assessed for possible connection to recent [place] travel, deemed unlikely as rash predated trip  - Determined rash distinct from patient's history of limited psoriasis  - Initiated symptomatic treatment while pursuing further diagnostic workup  - Consulted computer-assisted differential diagnosis tool for additional insights  - Planned to consult with dermatologists Dr. Ramsay or Dr. Katlyn Warren for expert opinion    L50.8 OTHER URTICARIA:  - Monitored the patient's ongoing rash on shoulders and neckline for 2-3 years, which would resolve with steroid cream application but recur after weeks.  - Noted current rash present on feet, legs, thighs, back, and upper chest.  - Observed blanching of the rash upon pressure.  - Assessed possible causes including allergic reaction, viral infection, or other diagnoses such as Morbilliform rash or Erythema Multiforme.  - Planned to consult with dermatologists and run labs.  - Prescribed Hydroxyzine (Atarax) for pruritus, up to 3 times daily.  - Recommend application of Calamine lotion.  - Prescribed Famotidine (Pepcid) to block allergic responses, 1 tablet twice daily for 10 days.    L40.8 OTHER PSORIASIS:  - Noted patient's history of psoriasis-type rash around calves that responded to topical corticosteroids.  - Acknowledged patient's report of increased psoriasis affecting hands.  - Reviewed previous treatment with topical corticosteroids prescribed by dermatologists.    R21 RASH AND OTHER NONSPECIFIC SKIN ERUPTION:  -  Explained possible etiologies of morbilliform rash, including viral infections, drug reactions, and autoimmune conditions.  - Discussed spontaneous resolution of some rashes, particularly those caused by common viral infections.  - Elucidated the significance of blanching in rashes.  - Instructed the patient to apply Calamine lotion to affected areas.  - Initiated Hydroxyzine (Atarax): 1 tablet up to 3 times daily as needed for pruritus.  - Cautioned against driving or alcohol consumption while taking Hydroxyzine.  - Started Famotidine (Pepcid): 1 tablet twice daily for 10 days to block allergic responses.  - Ordered labs including complete blood count, chemistry panel, and C-reactive protein.  - Captured clinical photographs of rash for dermatology consultation.  - Referred the patient to dermatologist Dr. Merna Ramsay.  - Attempted to expedite consultation.  - Advised the patient to contact the office for updates and further recommendations after lab results and dermatology consultation feedback are received.  - Noted widespread rash on feet, legs, thighs, back, and upper chest, which started before patient's travel to [place] and is not improving over time.  - Observed diffuse morbilliform rash on abdomen, back, legs, and thigh, blanching upon pressure.    Z85.828 PERSONAL HISTORY OF OTHER MALIGNANT NEOPLASM OF SKIN:  - Noted patient's history of skin cancer removal by Mohs surgeon.       In a 61-year-old male presenting with a diffuse morbilliform rash on the trunk, legs, arms, and abdomen without fever, and no recent exposure to new medications or detergents, the differential diagnosis can be broad. Here are some potential causes:  Viral Exanthems: Morbilliform rashes can be caused by viral infections, which are common and often resolve on their own[1][4].  Seroconversion Reactions: These occur during the initial stages of certain infections, such as viral infections, and can present with a morbilliform  rash[1].  Erythema Multiforme: Although typically more target-like, it can sometimes present with morbilliform features[1].  Autoimmune Conditions: Certain autoimmune diseases can cause skin rashes, though these are less common without other systemic symptoms.  Idiopathic Causes: Some cases may remain idiopathic despite thorough investigation.  Given the lack of fever and new exposures, a thorough clinical history, physical examination, and laboratory tests (including viral serology and liver function tests) are crucial to narrow down the differential diagnosis[1][4]. A punch biopsy may also be helpful for histopathological examination if the diagnosis remains unclear[1].  Sources:  Morbilliform drug reaction (maculopapular drug eruption) (https://dermnetnz.org/topics/morbilliform-drug-reaction)  Deadly drug rashes: Early recognition and multidisciplinary care (https://www.ccjm.org/content/90/6/373)  Morbilliform rash with deranged liver functions (https://www1.racgp.org.au/ajgp/2022/march/morbilliform-rash-with-deranged-liver-functions)  www.[employer]rive.Contract Live (https://www.Shotfarmcine.Contract Live/Cuturialane/view/[place]_Handbook/770895/0/Dermatology)  www.science.gov (https://www.science.gov/topicpages/f/fever+skin+rash)  Plan:   Rash  Comments:  Diffuse papulosquamous rash involving the trunk, chest of subacute onset.  No historical details.?  Psoriasis  Orders:  -     CBC Auto Differential; Future; Expected date: 03/04/2025  -     Comprehensive Metabolic Panel; Future; Expected date: 03/03/2025  -     C-Reactive Protein; Future; Expected date: 03/03/2025  -     Sedimentation rate; Future; Expected date: 03/03/2025  -     hydrOXYzine HCL (ATARAX) 10 MG Tab; Take 1 tablet (10 mg total) by mouth 3 (three) times daily as needed (itching due to rash).  Dispense: 30 tablet; Refill: 0  -     famotidine (PEPCID) 20 MG tablet; Take 1 tablet (20 mg total) by mouth 2 (two) times daily. for 10 days  Dispense: 20 tablet;  Refill: 0    Itching  Comments:  Treat empirically with hydroxyzine and driving precautions  Orders:  -     hydrOXYzine HCL (ATARAX) 10 MG Tab; Take 1 tablet (10 mg total) by mouth 3 (three) times daily as needed (itching due to rash).  Dispense: 30 tablet; Refill: 0  -     famotidine (PEPCID) 20 MG tablet; Take 1 tablet (20 mg total) by mouth 2 (two) times daily. for 10 days  Dispense: 20 tablet; Refill: 0    Other eosinophilia  Comments:  Interestingly eosinophil count elevation has been noted which merits further investigations/eosinophilic syndrome less likely parasite    Rash as noted above.  Wide differential diagnosis.  Started before he went to [place] and hence unlikely measles  No new medications.  Check basic labs  Symptomatic treatment.  Check with Dermatology colleagues    Follow up in about 28 weeks (around 9/15/2025), or if symptoms worsen or fail to improve, for Preventive Physical.    Current Medications[2]    This note was generated with the assistance of ambient listening technology. Verbal consent was obtained by the patient and accompanying visitor(s) for the recording of patient appointment to facilitate this note. I attest to having reviewed and edited the generated note for accuracy, though some syntax or spelling errors may persist. Please contact the author of this note for any clarification.      Michael Tomas    Given the clinical presentation of a 59-year-old male with a diffuse papular squamous rash and chronic eosinophilia, several potential causes should be considered:  Eosinophilic Conditions: The elevated eosinophil count suggests conditions like hypereosinophilic syndrome (HES), which can present with skin manifestations and eosinophilia. However, HES typically involves more severe eosinophilia and multiorgan involvement[1].  Parasitic Infections: Although not mentioned, parasitic infections can cause chronic eosinophilia. However, these are less likely without a history of travel or  exposure.  Atopic Conditions: Atopic dermatitis or other allergic conditions could be considered, but they usually present with pruritus and are less likely to cause a diffuse papular squamous rash without other symptoms.  Neoplastic Conditions: Eosinophilia can be associated with certain malignancies, but these are typically accompanied by other systemic symptoms or abnormalities in blood work[1].  Idiopathic Causes: Some cases of chronic eosinophilia remain idiopathic, and the rash could be an unrelated condition.  Given the lack of medication involvement and normal inflammatory markers, further diagnostic workup is needed to determine the cause of the rash and eosinophilia. This might include skin biopsies, allergy testing, or evaluation for underlying infections or malignancies.   Without additional information or specific diagnostic findings, it's challenging to pinpoint the exact cause of this patient's symptoms.  Sources:  Eosinophilic lung diseases (https://emcrit.org/ibcc/eosinophil/)  Skin conditions and HIV/AIDS: A Complete Overview - DermNet (https://dermnetnz.org/topics/skin-conditions-relating-to-hiv-infection)  Managing Patients with Hypereosinophilic Syndrome: A Statement from the [place] Society of Allergy, Asthma, and Clinical Immunology (SIAAIC) (https://www.mdpi.com/0506-33034409/13/14/1180)  ACP [place] Chapter Meeting 2022 (https://www.acponline.org/sites/default/files/documents/about_acp/chapters/mi/residents_day_2022_-_resident_posters_online.pdf)  www.nyacp.org (https://www.nyacp.org/files/Final_Poster_Book_-_Fall_2022(1).pdf)       [1]   Social History  Socioeconomic History    Marital status:     Number of children: 4   Occupational History    Occupation: Construction- contracter [employer]     Comment: Self - Brandon Suazo - construction   Tobacco Use    Smoking status: Never    Smokeless tobacco: Never   Substance and Sexual Activity    Alcohol use: Yes     Alcohol/week: 2.0 - 4.0  standard drinks of alcohol     Types: 1 - 2 Cans of beer, 1 - 2 Shots of liquor per week     Comment: 1-2 per week    Drug use: No    Sexual activity: Yes     Partners: Female     Comment: No protection-wife may be perimenopausal.   Social History Narrative     GGBG        G- 34    G-32    B- 28    G 23     Social Drivers of Health     Financial Resource Strain: Low Risk  (2022)    Overall Financial Resource Strain (CARDIA)     Difficulty of Paying Living Expenses: Not hard at all   Food Insecurity: No Food Insecurity (9/10/2024)    Hunger Vital Sign     Worried About Running Out of Food in the Last Year: Never true     Ran Out of Food in the Last Year: Never true   Transportation Needs: No Transportation Needs (2022)    PRAPARE - Transportation     Lack of Transportation (Medical): No     Lack of Transportation (Non-Medical): No   Physical Activity: Sufficiently Active (2022)    Exercise Vital Sign     Days of Exercise per Week: 5 days     Minutes of Exercise per Session: 30 min   Stress: No Stress Concern Present (2022)    [place] [employer] - Occupational Stress Questionnaire     Feeling of Stress : Only a little   Housing Stability: Low Risk  (3/3/2025)    Housing Stability Vital Sign     Unable to Pay for Housing in the Last Year: No     Number of Times Moved in the Last Year: 1     Homeless in the Last Year: No   [2]   Current Outpatient Medications:     acetaminophen (TYLENOL) 500 MG tablet, Take 500 mg by mouth every 6 (six) hours as needed for Pain., Disp: , Rfl:     sildenafiL (VIAGRA) 25 MG tablet, Take 1 tablet (25 mg total) by mouth daily as needed for Erectile Dysfunction., Disp: 30 tablet, Rfl: 3    clobetasoL (TEMOVATE) 0.05 % cream, SMARTSI Sparingly Topical Twice Daily PRN (Patient not taking: Reported on 3/3/2025), Disp: , Rfl:     clotrimazole-betamethasone 1-0.05% (LOTRISONE) cream, SMARTSIG:Sparingly Topical Twice Daily (Patient not taking: Reported  on 3/3/2025), Disp: , Rfl:     desoximetasone (TOPICORT) 0.05 % cream, Apply topically 2 (two) times daily. Apply twice a day on affected areas of psoriatic patch. (Patient not taking: Reported on 3/3/2025), Disp: 60 g, Rfl: 1    famotidine (PEPCID) 20 MG tablet, Take 1 tablet (20 mg total) by mouth 2 (two) times daily. for 10 days, Disp: 20 tablet, Rfl: 0    hydrocortisone 2.5 % cream, Apply topically. (Patient not taking: Reported on 3/3/2025), Disp: , Rfl:     hydrOXYzine HCL (ATARAX) 10 MG Tab, Take 1 tablet (10 mg total) by mouth 3 (three) times daily as needed (itching due to rash)., Disp: 30 tablet, Rfl: 0    ketoconazole (NIZORAL) 2 % cream, Apply topically 2 (two) times daily. (Patient not taking: Reported on 3/3/2025), Disp: , Rfl:

## 2025-03-03 NOTE — Clinical Note
Dear Devendra- can you help me with the gentleman who has a diffuse morbilliform rash in the abdomen, back, legs and thigh.  This started before his travel to [place] when he came back.  No exposure in [place].  No fever.  No risk factors.  He did have a history of psoriasis in a very limited area in past.  I have ordered some basic labs including blood counts, chemistry, sed rate and CRP.  I have given him some a tracks and famotidine.  Can he be accommodated earlier.    Dr. Genoveva MARTÍNEZ  Pictures in my progress note.

## 2025-03-05 ENCOUNTER — OFFICE VISIT (OUTPATIENT)
Dept: DERMATOLOGY | Facility: CLINIC | Age: 60
End: 2025-03-05
Payer: COMMERCIAL

## 2025-03-05 DIAGNOSIS — L29.9 PRURITUS: ICD-10-CM

## 2025-03-05 DIAGNOSIS — R21 RASH: Primary | ICD-10-CM

## 2025-03-05 PROCEDURE — 1159F MED LIST DOCD IN RCRD: CPT | Mod: CPTII,S$GLB,, | Performed by: STUDENT IN AN ORGANIZED HEALTH CARE EDUCATION/TRAINING PROGRAM

## 2025-03-05 PROCEDURE — 99204 OFFICE O/P NEW MOD 45 MIN: CPT | Mod: 25,S$GLB,, | Performed by: STUDENT IN AN ORGANIZED HEALTH CARE EDUCATION/TRAINING PROGRAM

## 2025-03-05 PROCEDURE — 1160F RVW MEDS BY RX/DR IN RCRD: CPT | Mod: CPTII,S$GLB,, | Performed by: STUDENT IN AN ORGANIZED HEALTH CARE EDUCATION/TRAINING PROGRAM

## 2025-03-05 PROCEDURE — 11104 PUNCH BX SKIN SINGLE LESION: CPT | Mod: S$GLB,,, | Performed by: STUDENT IN AN ORGANIZED HEALTH CARE EDUCATION/TRAINING PROGRAM

## 2025-03-05 RX ORDER — PREDNISONE 10 MG/1
TABLET ORAL
Qty: 40 TABLET | Refills: 0 | Status: SHIPPED | OUTPATIENT
Start: 2025-03-05

## 2025-03-05 RX ORDER — CETIRIZINE HYDROCHLORIDE 10 MG/1
10 TABLET ORAL DAILY
Qty: 30 TABLET | Refills: 1 | Status: SHIPPED | OUTPATIENT
Start: 2025-03-05 | End: 2026-03-05

## 2025-03-05 RX ORDER — TRIAMCINOLONE ACETONIDE 1 MG/G
CREAM TOPICAL 2 TIMES DAILY
Qty: 454 G | Refills: 0 | Status: SHIPPED | OUTPATIENT
Start: 2025-03-05

## 2025-03-05 NOTE — PATIENT INSTRUCTIONS
Prednisone 40mg x 4 days, 30mg x 4 days, 20mg x 4 days, 10 mg x 4 days in the AM  Triamcinolone 0.1% cream twice daily x 2 weeks  Zyrtec in the morning  Hydroxyzine 20mg before bed   Stop supplements  Stop pepcid       Punch Biopsy Wound Care    Your doctor has performed a punch biopsy today.  A band aid and antibiotic ointment has been placed over the site.  This should remain in place for 24 hours.  It is recommended that you keep the area dry for the first 24 hours.  After 24 hours, you may remove the band aid and wash the area with warm soap and water and apply Vaseline jelly.  Many patients prefer to use Neosporin or Bacitracin ointment.  This is acceptable; however know that you can develop an allergy to this medication even if you have used it safely for years.  It is important to keep the area moist.  Letting it dry out and get air slows healing time, will worsen the scar, and make it more difficult to remove the stitches if they were placed.  Band aid is optional after first 24 hours.      If you notice increasing redness, tenderness, pain, or yellow drainage at the biopsy or surgical site, please notify your doctor.  These are signs of an infection.    If your biopsy/surgical site is bleeding, apply firm pressure for 15 minutes straight.  Repeat for another 15 minutes, if it is still bleeding.   If the surgical site continues to bleed, then please contact your doctor.      [address] [place] [address]/ [phone] FAX/ [email or web address]

## 2025-03-05 NOTE — PROGRESS NOTES
Subjective:      Patient ID:  Brandon Suazo Sr. is a 59 y.o. male who presents for   Chief Complaint   Patient presents with    Rash     body     New Patient     Patient is coming with complaints of a rash all over his body. States that he had psoriasis and used steroid cream and it would go away. But in the past 5 years he started to get new patches on calf, arms, neck line, and chest. Dr. Boo gave him a compound cream and it would help. He went to see Dr. Menchaca and they just gave him more steroid cream. Within the last 6-8 weeks he has started with this new rash.  First started on his legs and then spread to hips, abdomen, chest, arms, back. Itchy, red, raised. Looks like hives but they are always there. Has not been prescribed anything for this rash. A few weeks ago he used triamcinolone cream.   No recent illness, fever, cough.   Did travel to [place] last week but rash there before  Previous Tx.  Ketoconazole 2.5% cream  Hydrocortisone 2.5% cream  Clobetasol 0.05% cream  Clotrimazole betamethasone 1-0.5% cream  Desoximethasone 0.05% cream  Triamcinolone cream     Hydroxyzine given on Monday.     Takes move MD joint supplement- 2 years  Prostate supplement- saw palmetto     Current Outpatient Medications:      ·  hydrOXYzine HCL (ATARAX) 10 MG Tab, Take 1 tablet (10 mg total) by mouth 3 (three) times daily as needed (itching due to rash)., Disp: 30 tablet, Rfl: 0  ·  sildenafiL (VIAGRA) 25 MG tablet, Take 1 tablet (25 mg total) by mouth daily as needed for Erectile Dysfunction., Disp: 30 tablet, Rfl: 3  ·  clobetasoL (TEMOVATE) 0.05 % cream, SMARTSI Sparingly Topical Twice Daily PRN (Patient not taking: Reported on 3/5/2025), Disp: , Rfl:   ·  clotrimazole-betamethasone 1-0.05% (LOTRISONE) cream, SMARTSIG:Sparingly Topical Twice Daily (Patient not taking: Reported on 3/5/2025), Disp: , Rfl:   ·  desoximetasone (TOPICORT) 0.05 % cream, Apply topically 2 (two) times daily. Apply twice a day on affected  areas of psoriatic patch. (Patient not taking: Reported on 3/5/2025), Disp: 60 g, Rfl: 1  ·  hydrocortisone 2.5 % cream, Apply topically. (Patient not taking: Reported on 3/5/2025), Disp: , Rfl:   ·  ketoconazole (NIZORAL) 2 % cream, Apply topically 2 (two) times daily. (Patient not taking: Reported on 3/5/2025), Disp: , Rfl:     Dr. Tomas  Brandon reports a history of recurrent rash on his shoulders and neckline for the past 2-3 years, previously treated with steroid cream prescribed by a dermatologist. The cream would resolve the rash within a few days, with recurrence after weeks. Recently, a new rash has appeared on his feet, legs, thighs, back, and upper chest. This new rash differs from the previous one and does not respond to the steroid cream. The rash is itchy, interferes with sleep, and blanches when pressed. He had the rash before traveling to [place] on Saturday, specifically to the [facility] area in [facility]. The rash is not improving over time.     Brandon has a history of psoriasis, which previously affected limited areas and has recently started to affect his hands more than before. He has been evaluated by several dermatologists in the past, including Dr. Ramsay, Dr. Boo, and Dr. Jeff (a Mohs surgeon who removed a skin cancer). Dr. Menchaca, described as a well-known dermatologist, has also seen him and prescribed more treatment. He reports no clear trigger for the current rash, such as food or medication, and mentions that changing detergents did not affect the condition.     Brandon denies fever, cough, mucus, recent measles exposure, and any travel outside the country.          Review of Systems   Constitutional:  Negative for fever, chills and fatigue.   Respiratory:  Negative for cough and shortness of breath.    Gastrointestinal:  Negative for nausea, vomiting and diarrhea.   Skin:  Positive for itching and rash.   Hematologic/Lymphatic: Does not bruise/bleed easily.       Objective:   Physical  Exam   Constitutional: He appears well-developed and well-nourished.   Neurological: He is alert and oriented to person, place, and time.   Psychiatric: He has a normal mood and affect.   Skin:   Areas Examined (abnormalities noted in diagram):   Head / Face Inspection Performed  Neck Inspection Performed  Chest / Axilla Inspection Performed  Abdomen Inspection Performed  Genitals / Buttocks / Groin Inspection Performed  Back Inspection Performed  RUE Inspected  LUE Inspection Performed  RLE Inspected  LLE Inspection Performed            Diagram Legend     Erythematous scaling macule/papule c/w actinic keratosis       Vascular papule c/w angioma      Pigmented verrucoid papule/plaque c/w seborrheic keratosis      Yellow umbilicated papule c/w sebaceous hyperplasia      Irregularly shaped tan macule c/w lentigo     1-2 mm smooth white papules consistent with Milia      Movable subcutaneous cyst with punctum c/w epidermal inclusion cyst      Subcutaneous movable cyst c/w pilar cyst      Firm pink to brown papule c/w dermatofibroma      Pedunculated fleshy papule(s) c/w skin tag(s)      Evenly pigmented macule c/w junctional nevus     Mildly variegated pigmented, slightly irregular-bordered macule c/w mildly atypical nevus      Flesh colored to evenly pigmented papule c/w intradermal nevus       Pink pearly papule/plaque c/w basal cell carcinoma      Erythematous hyperkeratotic cursted plaque c/w SCC      Surgical scar with no sign of skin cancer recurrence      Open and closed comedones      Inflammatory papules and pustules      Verrucoid papule consistent consistent with wart     Erythematous eczematous patches and plaques     Dystrophic onycholytic nail with subungual debris c/w onychomycosis     Umbilicated papule    Erythematous-base heme-crusted tan verrucoid plaque consistent with inflamed seborrheic keratosis     Erythematous Silvery Scaling Plaque c/w Psoriasis     See  annotation                                                Assessment / Plan:      Pathology Orders:       Normal Orders This Visit    Specimen to Pathology, Dermatology     Comments:    Number of Specimens:->2  ------------------------->-------------------------  Spec 1 Procedure:->Biopsy  Spec 1 Clinical Impression:->hx of psoriasis now with new  onset fixed erythematous thin papules and plaques with some  mild desquamation on arms, legs, abdomen, back, hips elevated  eosinophils, no burrows dx: drug eruption vs. eczema vs.  urticarial dermatitis  Spec 1 Source:->back  ------------------------->-------------------------  Spec 2 Procedure:->Biopsy  Spec 2 Clinical Impression:->hx of psoriasis now with new  onset fixed erythematous thin papules and plaques with some  mild desquamation on arms, legs, abdomen, back, hips elevated  eosinophils, no burrows dx: drug eruption vs. eczema vs.  urticarial dermatitis  Spec 2 Source:->abdomen    Questions:    Procedure Type: Dermatology and skin neoplasms    Number of Specimens: 2    ------------------------: -------------------------    Spec 1 Procedure: Biopsy    Spec 1 Clinical Impression: hx of psoriasis now with new onset fixed erythematous thin papules and plaques with some mild desquamation on arms, legs, abdomen, back, hips elevated eosinophils, no burrows dx: drug eruption vs. eczema vs. urticarial dermatitis    Spec 1 Source: back    ------------------------: -------------------------    Spec 2 Procedure: Biopsy    Spec 2 Clinical Impression: hx of psoriasis now with new onset fixed erythematous thin papules and plaques with some mild desquamation on arms, legs, abdomen, back, hips elevated eosinophils, no burrows dx: drug eruption vs. eczema vs. urticarial dermatitis    Spec 2 Source: abdomen    Release to patient:           Rash  -     Specimen to Pathology, Dermatology  Punch biopsy procedure note:  Punch biopsy performed after verbal consent obtained. Area marked  and prepped with alcohol. Approximately 1cc of 1% lidocaine with epinephrine injected. 4 mm disposable punch used to remove lesion. Hemostasis obtained and biopsy site closed with 1 - 2 Prolene sutures. Wound care instructions reviewed with patient and handout given.    Pruritus  -     triamcinolone acetonide 0.1% (KENALOG) 0.1 % cream; Apply topically 2 (two) times daily.  Dispense: 454 g; Refill: 0  -     predniSONE (DELTASONE) 10 MG tablet; Take 40mg x 4 days, 30mg x 4 days, 20mg x 4 days then 10mg x 4 days  Dispense: 40 tablet; Refill: 0  -     cetirizine (ZYRTEC) 10 MG tablet; Take 1 tablet (10 mg total) by mouth once daily.  Dispense: 30 tablet; Refill: 1    Reviewed labs- esr and CRP WNL, eosinophil count slowly increasing for the past year, not on any medications or recent antibiotics however he does take 2 otc supplements which I have asked him to discontinue, liver and kidney function WNL  Discussed benefits and risks of treatment with prednisone including but not limited to increased appetite, weight gain, irritability, insomnia, fluid retention, GI upset, increased blood pressure, and increased blood sugars. If Prednisone is needed long term (>4 weeks), additional side effects such as kidney stones, gastric ulceration, avascular necrosis of femoral head may be encountered.  Prednisone 40mg x 4 days, 30mg x 4 days, 20mg x 4 days, 10 mg x 4 days in the AM  Triamcinolone 0.1% cream twice daily x 2 weeks  Zyrtec in the morning  Hydroxyzine 20mg before bed   Stop supplements  Stop pepcid            No follow-ups on file.

## 2025-03-13 ENCOUNTER — PATIENT MESSAGE (OUTPATIENT)
Dept: FAMILY MEDICINE | Facility: CLINIC | Age: 60
End: 2025-03-13
Payer: COMMERCIAL

## 2025-03-18 ENCOUNTER — RESULTS FOLLOW-UP (OUTPATIENT)
Dept: DERMATOLOGY | Facility: CLINIC | Age: 60
End: 2025-03-18

## 2025-03-20 ENCOUNTER — OFFICE VISIT (OUTPATIENT)
Dept: DERMATOLOGY | Facility: CLINIC | Age: 60
End: 2025-03-20
Payer: COMMERCIAL

## 2025-03-20 DIAGNOSIS — L40.9 PSORIASIS: ICD-10-CM

## 2025-03-20 DIAGNOSIS — L50.9 URTICARIAL DERMATITIS: Primary | ICD-10-CM

## 2025-03-20 DIAGNOSIS — L57.0 ACTINIC KERATOSIS: ICD-10-CM

## 2025-03-20 DIAGNOSIS — L60.3 NAIL DYSTROPHY: ICD-10-CM

## 2025-03-20 PROCEDURE — 1159F MED LIST DOCD IN RCRD: CPT | Mod: CPTII,S$GLB,, | Performed by: STUDENT IN AN ORGANIZED HEALTH CARE EDUCATION/TRAINING PROGRAM

## 2025-03-20 PROCEDURE — 17000 DESTRUCT PREMALG LESION: CPT | Mod: S$GLB,,, | Performed by: STUDENT IN AN ORGANIZED HEALTH CARE EDUCATION/TRAINING PROGRAM

## 2025-03-20 PROCEDURE — 1160F RVW MEDS BY RX/DR IN RCRD: CPT | Mod: CPTII,S$GLB,, | Performed by: STUDENT IN AN ORGANIZED HEALTH CARE EDUCATION/TRAINING PROGRAM

## 2025-03-20 PROCEDURE — 99214 OFFICE O/P EST MOD 30 MIN: CPT | Mod: 25,S$GLB,, | Performed by: STUDENT IN AN ORGANIZED HEALTH CARE EDUCATION/TRAINING PROGRAM

## 2025-03-20 RX ORDER — TRIAMCINOLONE ACETONIDE 1 MG/G
CREAM TOPICAL 2 TIMES DAILY
Qty: 80 G | Refills: 1 | Status: SHIPPED | OUTPATIENT
Start: 2025-03-20

## 2025-03-20 NOTE — PROGRESS NOTES
Subjective:      Patient ID:  Brandon Suazo Fadumo is a 59 y.o. male who presents for   Chief Complaint   Patient presents with    Rash     LOV 3/5/25    Patient here today for suture removal and to discuss biopsy results.   Patient states rash is clearing. No longer itching. Has one day left of prednisone taper. Discontinued triamcinolone when he started to clear. Taking hydroxyzine nightly. Took zyrtec daily, stopped today.  Stopped supplements.    Previously diagnosed with psoriasis- gets plaques on hands, scalp, and changes to nails.   Denies joint pain, redness or swelling in fingers, toes, heals.     Previous Tx.  Ketoconazole 2.5% cream  Hydrocortisone 2.5% cream  Clobetasol 0.05% cream  Clotrimazole betamethasone 1-0.5% cream  Desoximethasone 0.05% cream  Triamcinolone cream       Current Outpatient Medications:   ·  acetaminophen (TYLENOL) 500 MG tablet, Take 500 mg by mouth every 6 (six) hours as needed for Pain., Disp: , Rfl:   ·  cetirizine (ZYRTEC) 10 MG tablet, Take 1 tablet (10 mg total) by mouth once daily., Disp: 30 tablet, Rfl: 1  ·  predniSONE (DELTASONE) 10 MG tablet, Take 40mg x 4 days, 30mg x 4 days, 20mg x 4 days then 10mg x 4 days, Disp: 40 tablet, Rfl: 0  ·  sildenafiL (VIAGRA) 25 MG tablet, Take 1 tablet (25 mg total) by mouth daily as needed for Erectile Dysfunction., Disp: 30 tablet, Rfl: 3  ·  triamcinolone acetonide 0.1% (KENALOG) 0.1 % cream, Apply topically 2 (two) times daily., Disp: 454 g, Rfl: 0  ·  clobetasoL (TEMOVATE) 0.05 % cream, SMARTSI Sparingly Topical Twice Daily PRN (Patient not taking: Reported on 3/20/2025), Disp: , Rfl:   ·  clotrimazole-betamethasone 1-0.05% (LOTRISONE) cream, SMARTSIG:Sparingly Topical Twice Daily (Patient not taking: Reported on 3/20/2025), Disp: , Rfl:   ·  desoximetasone (TOPICORT) 0.05 % cream, Apply topically 2 (two) times daily. Apply twice a day on affected areas of psoriatic patch. (Patient not taking: Reported on 3/20/2025), Disp: 60 g,  Rfl: 1  ·  famotidine (PEPCID) 20 MG tablet, Take 1 tablet (20 mg total) by mouth 2 (two) times daily. for 10 days (Patient not taking: Reported on 3/5/2025), Disp: 20 tablet, Rfl: 0  ·  hydrocortisone 2.5 % cream, Apply topically. (Patient not taking: Reported on 3/20/2025), Disp: , Rfl:   ·  ketoconazole (NIZORAL) 2 % cream, Apply topically 2 (two) times daily. (Patient not taking: Reported on 3/20/2025), Disp: , Rfl:   ·  triamcinolone acetonide 0.1% (KENALOG) 0.1 % cream, Apply topically 2 (two) times daily., Disp: 80 g, Rfl: 1      Final Pathologic Diagnosis 1. Skin, back, punch biopsy:  -ALLERGIC URTICARIAL REACTION, see comment    2. Skin, abdomen, punch biopsy:  -ALLERGIC URTICARIAL REACTION, see comment    COMMENT:  Clinical images were reviewed in epic and differential diagnosis is noted.  The histological findings are somewhat nonspecific.  There is mild epidermal spongiosis with exocytosis of a lymphocytes and eosinophils.  However, there is a brisk  superficial perivascular and interstitial lymphohistiocytic infiltrate with admixed eosinophils and neutrophils.  The changes likely represent a drug eruption.  Urticarial bullous pemphigoid could be considered and direct immunofluorescence is  recommended.  Although there is spongiosis and exocytosis of lymphocytes and an eczematous process was considered, the underlying inflammatory infiltrate is fairly brisk and disproportionate to the epidermal changes.  Therefore, an eczematous process  would be less likely.  Correlation is recommended.       Takes move MD joint supplement- 2 years  Prostate supplement- saw palmetto     No hx of HIV,   Cologuard - no hx of colonoscopy                   Review of Systems   Constitutional:  Negative for fever, chills and fatigue.   Respiratory:  Negative for cough and shortness of breath.    Gastrointestinal:  Negative for nausea, vomiting and diarrhea.   Skin:  Positive for itching and rash.   Hematologic/Lymphatic: Does  not bruise/bleed easily.       Objective:   Physical Exam   Constitutional: He appears well-developed and well-nourished.   Neurological: He is alert and oriented to person, place, and time.   Psychiatric: He has a normal mood and affect.   Skin:   Areas Examined (abnormalities noted in diagram):   Scalp / Hair Palpated and Inspected  Head / Face Inspection Performed  Neck Inspection Performed  Back Inspection Performed  RUE Inspected  LUE Inspection Performed  Nails and Digits Inspection Performed                Diagram Legend     Erythematous scaling macule/papule c/w actinic keratosis       Vascular papule c/w angioma      Pigmented verrucoid papule/plaque c/w seborrheic keratosis      Yellow umbilicated papule c/w sebaceous hyperplasia      Irregularly shaped tan macule c/w lentigo     1-2 mm smooth white papules consistent with Milia      Movable subcutaneous cyst with punctum c/w epidermal inclusion cyst      Subcutaneous movable cyst c/w pilar cyst      Firm pink to brown papule c/w dermatofibroma      Pedunculated fleshy papule(s) c/w skin tag(s)      Evenly pigmented macule c/w junctional nevus     Mildly variegated pigmented, slightly irregular-bordered macule c/w mildly atypical nevus      Flesh colored to evenly pigmented papule c/w intradermal nevus       Pink pearly papule/plaque c/w basal cell carcinoma      Erythematous hyperkeratotic cursted plaque c/w SCC      Surgical scar with no sign of skin cancer recurrence      Open and closed comedones      Inflammatory papules and pustules      Verrucoid papule consistent consistent with wart     Erythematous eczematous patches and plaques     Dystrophic onycholytic nail with subungual debris c/w onychomycosis     Umbilicated papule    Erythematous-base heme-crusted tan verrucoid plaque consistent with inflamed seborrheic keratosis     Erythematous Silvery Scaling Plaque c/w Psoriasis     See annotation                        Assessment / Plan:         Urticarial dermatitis  -     Bullous Pemphigoid, , , IgG, Serum; Future; Expected date: 03/20/2025  -     triamcinolone acetonide 0.1% (KENALOG) 0.1 % cream; Apply topically 2 (two) times daily.  Dispense: 80 g; Refill: 1  Biopsy showed allergic urticarial dermatitis, most consistent with a drug eruption, less likely BP, improved with prednisone taper, zyrtec, recommend he continues to take zyrtec daily for at least a month as he completes pred taper  Reviewed med list- would like him to remain off of supplements MD move and saw palmetto for now  Discussed less likely possibility of bullous pemphigoid- if eruption returns he can get antibodies drawn, do not want him to do this now as rash has cleared and he has been taking prednisone  Can use tac 0.1% cream if eruption returns, pharmacy did not give him 454g jar as prescribed- 2 80mg tubes- sent refills  If eruption returning he should rtc    Psoriasis  Nail dystrophy  Small plaque returning on scalp and previously on hands prior to prednisone taper  Long standing plaque psoriasis that affects hands, scalp, and possibly nails as well, denies joint involvement, has never been on systemic treatment due to concern with side effects  Discussed tremfya vs. Skyrizi and hand outs for both given- would try for skyrizi first if he decides he wants to try a biologic  Discussed  benefits and risks of Skyrizi including but not limited to injection site reactions, increased risk of infection, and decreased tumor surveillance. Patient informed to discontinue Skyrizi and notify our office if an infection develops.  Patient counseled to avoid live vaccines.    Need baseline CBC, CMP, Hepatitis B, Hepatitis C, and quantiferon gold.     Start Skyrizi at 150mg (autoinjector) SQ on week 0 then week 4 then every 3rd month    Will need CBC q 6 months. Will need Quantiferon gold annually.      Actinic keratosis, scalp  Cryosurgery Procedure Note    Verbal consent from the  patient is obtained and the patient is aware of the precancerous quality and need for treatment of these lesions. Liquid nitrogen cryosurgery is applied to the 1 actinic keratoses, as detailed in the physical exam, to produce a freeze injury. The patient is aware that blisters may form and is instructed on wound care with gentle cleansing and use of vaseline ointment to keep moist until healed. The patient is supplied a handout on cryosurgery and is instructed to call if lesions do not completely resolve.             No follow-ups on file.

## 2025-03-25 NOTE — PATIENT INSTRUCTIONS

## 2025-04-15 ENCOUNTER — HOSPITAL ENCOUNTER (OUTPATIENT)
Dept: RADIOLOGY | Facility: HOSPITAL | Age: 60
Discharge: HOME | End: 2025-04-15
Attending: ORTHOPAEDIC SURGERY
Payer: COMMERCIAL

## 2025-04-15 ENCOUNTER — OFFICE VISIT (OUTPATIENT)
Dept: ORTHOPEDICS | Facility: CLINIC | Age: 60
End: 2025-04-15
Payer: COMMERCIAL

## 2025-04-15 VITALS
WEIGHT: 160 LBS | BODY MASS INDEX: 28.35 KG/M2 | HEIGHT: 63 IN | DIASTOLIC BLOOD PRESSURE: 70 MMHG | SYSTOLIC BLOOD PRESSURE: 118 MMHG

## 2025-04-15 DIAGNOSIS — M17.12 PRIMARY OSTEOARTHRITIS OF LEFT KNEE: ICD-10-CM

## 2025-04-15 DIAGNOSIS — M17.11 PRIMARY OSTEOARTHRITIS OF RIGHT KNEE: Primary | ICD-10-CM

## 2025-04-15 PROCEDURE — 3078F DIAST BP <80 MM HG: CPT | Mod: CPTII,S$GLB,, | Performed by: ORTHOPAEDIC SURGERY

## 2025-04-15 PROCEDURE — 1159F MED LIST DOCD IN RCRD: CPT | Mod: CPTII,S$GLB,, | Performed by: ORTHOPAEDIC SURGERY

## 2025-04-15 PROCEDURE — 73562 X-RAY EXAM OF KNEE 3: CPT | Mod: 26,,, | Performed by: RADIOLOGY

## 2025-04-15 PROCEDURE — 3074F SYST BP LT 130 MM HG: CPT | Mod: CPTII,S$GLB,, | Performed by: ORTHOPAEDIC SURGERY

## 2025-04-15 PROCEDURE — 20610 DRAIN/INJ JOINT/BURSA W/O US: CPT | Mod: 50,S$GLB,, | Performed by: ORTHOPAEDIC SURGERY

## 2025-04-15 PROCEDURE — 3008F BODY MASS INDEX DOCD: CPT | Mod: CPTII,S$GLB,, | Performed by: ORTHOPAEDIC SURGERY

## 2025-04-15 PROCEDURE — 99999 PR PBB SHADOW E&M-EST. PATIENT-LVL III: CPT | Mod: PBBFAC,,, | Performed by: ORTHOPAEDIC SURGERY

## 2025-04-15 PROCEDURE — 99213 OFFICE O/P EST LOW 20 MIN: CPT | Mod: 25,S$GLB,, | Performed by: ORTHOPAEDIC SURGERY

## 2025-04-15 PROCEDURE — 73562 X-RAY EXAM OF KNEE 3: CPT | Mod: TC,50,PN

## 2025-04-15 PROCEDURE — 1160F RVW MEDS BY RX/DR IN RCRD: CPT | Mod: CPTII,S$GLB,, | Performed by: ORTHOPAEDIC SURGERY

## 2025-04-15 RX ORDER — TRIAMCINOLONE ACETONIDE 40 MG/ML
40 INJECTION, SUSPENSION INTRA-ARTICULAR; INTRAMUSCULAR
Status: DISCONTINUED | OUTPATIENT
Start: 2025-04-15 | End: 2025-04-15 | Stop reason: HOSPADM

## 2025-04-15 RX ADMIN — TRIAMCINOLONE ACETONIDE 40 MG: 40 INJECTION, SUSPENSION INTRA-ARTICULAR; INTRAMUSCULAR at 08:04

## 2025-04-15 NOTE — PROCEDURES
Large Joint Aspiration/Injection: R knee    Date/Time: 4/15/2025 8:00 AM    Performed by: Harry Cannon MD  Authorized by: Harry Cannon MD    Consent Done?:  Yes (Verbal)  Indications:  Pain  Site marked: the procedure site was marked    Timeout: prior to procedure the correct patient, procedure, and site was verified    Prep: patient was prepped and draped in usual sterile fashion      Local anesthesia used?: Yes    Local anesthetic:  Lidocaine 1% without epinephrine    Details:  Needle Size:  25 G  Ultrasonic Guidance for needle placement?: No    Location:  Knee  Site:  R knee  Medications:  40 mg triamcinolone acetonide 40 mg/mL  Patient tolerance:  Patient tolerated the procedure well with no immediate complications  Large Joint Aspiration/Injection: L knee    Date/Time: 4/15/2025 8:00 AM    Performed by: Harry Cannon MD  Authorized by: Harry Cannon MD    Consent Done?:  Yes (Verbal)  Indications:  Pain  Site marked: the procedure site was marked    Timeout: prior to procedure the correct patient, procedure, and site was verified    Prep: patient was prepped and draped in usual sterile fashion      Local anesthesia used?: Yes    Local anesthetic:  Lidocaine 1% without epinephrine    Details:  Needle Size:  25 G  Ultrasonic Guidance for needle placement?: No    Location:  Knee  Site:  L knee  Medications:  40 mg triamcinolone acetonide 40 mg/mL  Patient tolerance:  Patient tolerated the procedure well with no immediate complications

## 2025-04-15 NOTE — PROGRESS NOTES
[facility]    Subjective:     Chief Complaint:   Chief Complaint   Patient presents with    Left Knee - Pain     Bilateral knee pain, prtety constant, pops every now and then, no giving way, left knee injected last on 5/30/23, H/O right knee scope on 4/24/23    Right Knee - Pain       Past Medical History:   Diagnosis Date    Basal cell carcinoma     Hyperlipidemia     Psoriasis     Squamous cell carcinoma of skin        Past Surgical History:   Procedure Laterality Date    CYSTOSCOPY N/A 9/6/2022    Procedure: CYSTOSCOPY;  Surgeon: Blu Mcfarland MD;  Location: [facility] OR;  Service: Urology;  Laterality: N/A;    SKIN CANCER EXCISION      TONSILLECTOMY      TRANSRECTAL ULTRASOUND EXAMINATION N/A 9/6/2022    Procedure: ULTRASOUND, RECTAL APPROACH;  Surgeon: Blu Mcfarland MD;  Location: [facility] OR;  Service: Urology;  Laterality: N/A;       Current Outpatient Medications   Medication Sig    acetaminophen (TYLENOL) 500 MG tablet Take 500 mg by mouth every 6 (six) hours as needed for Pain.    cetirizine (ZYRTEC) 10 MG tablet Take 1 tablet (10 mg total) by mouth once daily.    clobetasoL (TEMOVATE) 0.05 % cream     desoximetasone (TOPICORT) 0.05 % cream Apply topically 2 (two) times daily. Apply twice a day on affected areas of psoriatic patch.    sildenafiL (VIAGRA) 25 MG tablet Take 1 tablet (25 mg total) by mouth daily as needed for Erectile Dysfunction.    triamcinolone acetonide 0.1% (KENALOG) 0.1 % cream Apply topically 2 (two) times daily.    triamcinolone acetonide 0.1% (KENALOG) 0.1 % cream Apply topically 2 (two) times daily.    famotidine (PEPCID) 20 MG tablet Take 1 tablet (20 mg total) by mouth 2 (two) times daily. for 10 days (Patient not taking: Reported on 3/5/2025)     No current facility-administered medications for this visit.       Review of patient's allergies indicates:   Allergen Reactions    No known drug allergies        Family History   Problem Relation Name Age of Onset     Arthritis Mother Diana David     Hypertension Mother Diana David     Diabetes Mother Diana David     Macular degeneration Mother Diana David     Bell's palsy Mother Diana David     Cancer Father Dylan Suazo         liver    Alcohol abuse Father Dylan Suazo     Arthritis Sister Ailin         Knee problems and will have a scope.    HIV Maternal Uncle      Asthma Paternal Aunt      Cancer Maternal Grandfather          lung/ smoker       Social History[1]    History of present illness:  Comes in today for follow-up for his bilateral knees.  He has known arthrosis in his knees.  History of right knee arthroscopy right at 2 years ago.  He has had a right knee injected in the past.  Nothing on the left.  He is a very active outdoorsman.  It is begun to inhibit with his social activities and even his daily ADLs.    Review of Systems:    Constitution: Negative for chills, fever, and sweats.  Negative for unexplained weight loss.    HENT:  Negative for headaches and blurry vision.    Cardiovascular:Negative for chest pain or irregular heart beat. Negative for hypertension.    Respiratory:  Negative for cough and shortness of breath.    Gastrointestinal: Negative for abdominal pain, heartburn, melena, nausea, and vomitting.    Genitourinary:  Negative bladder incontinence and dysuria.    Musculoskeletal:  See HPI for details.     Neurological: Negative for numbness.    Psychiatric/Behavioral: Negative for depression.  The patient is not nervous/anxious.      Endocrine: Negative for polyuria    Hematologic/Lymphatic: Negative for bleeding problem.  Does not bruise/bleed easily.    Skin: Negative for poor would healing and rash    Objective:      Physical Examination:    Vital Signs:    Vitals:    04/15/25 0830   BP: 118/70       Body mass index is 28.34 kg/m².    This a well-developed, well nourished patient in no acute distress.  They are alert and oriented and cooperative to examination.        Bilateral knee  "exam: Skin to bilateral knees clean dry and intact.  No erythema or ecchymosis bilaterally.  No signs or symptoms of infection bilaterally.  Neurovascularly intact throughout bilateral lower extremities.  Negative straight leg raise maneuver bilaterally.  Negative Homans bilaterally.  He can weightbear as tolerated on bilateral lower extremities.  Nonantalgic gait.  Both knees diffusely tender along the medial aspect.  Positive crepitus with range of motioning bilaterally.  Bilateral knee range of motion well-preserved and symmetric at 0-130 degrees.    Pertinent New Results:    XRAY Report / Interpretation:   Three views taken of bilateral knees today: AP, lateral, sunrise views.  No acute fractures or dislocations seen bilaterally.  Patient has complete collapse of the medial compartment bilaterally with bone-on-bone deformity.    Assessment/Plan:      1. Bilateral knee osteoarthritis.      Treatment options were review with him today at length.  We specifically discussed NSAIDs, corticosteroid injections, viscosupplementation, and total knee arthroplasty.  Ultimately, he will need total knee arthroplasty at some point in the future.  I do not believe he would respond very well to viscosupplementation due to the severity of the OA in his knees.  We did inject bilateral knees today via an anterolateral approach with 40 mg of Kenalog and lidocaine respectively.  He tolerated this well.  We will see him back in 4 months or on an as-needed basis for repeat injections.  We will proceed with total knee arthroplasty at any point in time in the future when he is ready.    Jaylon Jon, Physician Assistant, served in the capacity as a "scribe" for this patient encounter.  A "face-to-face" encounter occurred with Dr. Harry Cannon on this date.  The treatment plan and medical decision-making is outlined above. Patient was seen and examined with a chaperone.       This note was created using Dragon voice recognition " software that occasionally misinterpreted phrases or words.               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   Social History Narrative     GGBG        G- 34    G-32    B- 28    G 23     Social Drivers of Health     Financial Resource Strain: Low Risk  (3/19/2025)    Overall Financial Resource Strain (CARDIA)     Difficulty of Paying Living Expenses: Not very hard   Food Insecurity: No Food Insecurity (3/19/2025)    Hunger Vital Sign     Worried About Running Out of Food in the Last Year: Never true     Ran Out of Food in the Last Year: Never true   Transportation Needs: No Transportation Needs (3/19/2025)    PRAPARE - Transportation     Lack of Transportation (Medical): No     Lack of Transportation (Non-Medical): No   Physical Activity: Sufficiently Active (3/19/2025)    Exercise Vital Sign     Days of Exercise per Week: 5 days     Minutes of Exercise per Session: 60 min   Stress: Stress Concern Present (3/19/2025)    [employer] [place] [employer] - Occupational Stress Questionnaire     Feeling of Stress : To some extent   Housing Stability: Low Risk  (3/19/2025)    Housing Stability Vital Sign     Unable to Pay for Housing in the Last Year: No     Number of Times Moved in the Last Year: 1     Homeless in the Last Year: No

## 2025-05-09 DIAGNOSIS — L29.9 PRURITUS: ICD-10-CM

## 2025-05-09 RX ORDER — CETIRIZINE HYDROCHLORIDE 10 MG/1
10 TABLET ORAL DAILY
Qty: 30 TABLET | Refills: 1 | Status: SHIPPED | OUTPATIENT
Start: 2025-05-09 | End: 2026-05-09

## (undated) DEVICE — LUBRICANT SURGILUBE 2 OZ

## (undated) DEVICE — WATER STERILE INJ 500ML BAG

## (undated) DEVICE — DRAPE MEDIUM SHEET 40X70IN

## (undated) DEVICE — SET CYSTO IRRIGATION UNIV SPIK

## (undated) DEVICE — GUIDE BIOPSY BIPLANAR 18G

## (undated) DEVICE — DRAPE MINOR FEN 98X77X121IN

## (undated) DEVICE — COVER TRANSDUCER LATEX N/STERI

## (undated) DEVICE — DRAPE UINDERBUT GRAD PCH

## (undated) DEVICE — UNDERPAD ULTRASORB 300LB 30X36

## (undated) DEVICE — SPONGE GAUZE 16PLY 4X4

## (undated) DEVICE — GLOVE SURG ULTRA TOUCH 7

## (undated) DEVICE — SCRUB 10% POVIDONE IODINE 4OZ